# Patient Record
Sex: FEMALE | Race: WHITE | NOT HISPANIC OR LATINO | Employment: FULL TIME | URBAN - METROPOLITAN AREA
[De-identification: names, ages, dates, MRNs, and addresses within clinical notes are randomized per-mention and may not be internally consistent; named-entity substitution may affect disease eponyms.]

---

## 2017-04-25 ENCOUNTER — OFFICE VISIT (OUTPATIENT)
Dept: OBSTETRICS AND GYNECOLOGY | Facility: CLINIC | Age: 42
End: 2017-04-25

## 2017-04-25 VITALS
WEIGHT: 176 LBS | HEIGHT: 69 IN | SYSTOLIC BLOOD PRESSURE: 112 MMHG | DIASTOLIC BLOOD PRESSURE: 80 MMHG | BODY MASS INDEX: 26.07 KG/M2

## 2017-04-25 DIAGNOSIS — Z01.419 ENCOUNTER FOR GYNECOLOGICAL EXAMINATION WITHOUT ABNORMAL FINDING: Primary | ICD-10-CM

## 2017-04-25 DIAGNOSIS — N92.0 MENORRHAGIA WITH REGULAR CYCLE: ICD-10-CM

## 2017-04-25 DIAGNOSIS — Z13.9 SCREENING: ICD-10-CM

## 2017-04-25 DIAGNOSIS — Z12.4 CERVICAL CANCER SCREENING: ICD-10-CM

## 2017-04-25 LAB
B-HCG UR QL: NEGATIVE
BASOPHILS # BLD AUTO: 0.04 10*3/MM3 (ref 0–0.2)
BASOPHILS NFR BLD AUTO: 0.4 % (ref 0–1.5)
BILIRUB BLD-MCNC: NEGATIVE MG/DL
CLARITY, POC: CLEAR
COLOR UR: YELLOW
EOSINOPHIL # BLD AUTO: 0.48 10*3/MM3 (ref 0–0.7)
EOSINOPHIL NFR BLD AUTO: 5 % (ref 0.3–6.2)
ERYTHROCYTE [DISTWIDTH] IN BLOOD BY AUTOMATED COUNT: 13.5 % (ref 11.7–13)
GLUCOSE UR STRIP-MCNC: NEGATIVE MG/DL
HCT VFR BLD AUTO: 45.4 % (ref 35.6–45.5)
HGB BLD-MCNC: 14.4 G/DL (ref 11.9–15.5)
IMM GRANULOCYTES # BLD: 0.05 10*3/MM3 (ref 0–0.03)
IMM GRANULOCYTES NFR BLD: 0.5 % (ref 0–0.5)
INTERNAL NEGATIVE CONTROL: NEGATIVE
INTERNAL POSITIVE CONTROL: POSITIVE
KETONES UR QL: NEGATIVE
LEUKOCYTE EST, POC: NEGATIVE
LYMPHOCYTES # BLD AUTO: 2.61 10*3/MM3 (ref 0.9–4.8)
LYMPHOCYTES NFR BLD AUTO: 27.4 % (ref 19.6–45.3)
Lab: NORMAL
MCH RBC QN AUTO: 31.6 PG (ref 26.9–32)
MCHC RBC AUTO-ENTMCNC: 31.7 G/DL (ref 32.4–36.3)
MCV RBC AUTO: 99.8 FL (ref 80.5–98.2)
MONOCYTES # BLD AUTO: 1.05 10*3/MM3 (ref 0.2–1.2)
MONOCYTES NFR BLD AUTO: 11 % (ref 5–12)
NEUTROPHILS # BLD AUTO: 5.31 10*3/MM3 (ref 1.9–8.1)
NEUTROPHILS NFR BLD AUTO: 55.7 % (ref 42.7–76)
NITRITE UR-MCNC: NEGATIVE MG/ML
PH UR: 5 [PH] (ref 5–8)
PLATELET # BLD AUTO: 407 10*3/MM3 (ref 140–500)
PROT UR STRIP-MCNC: NEGATIVE MG/DL
RBC # BLD AUTO: 4.55 10*6/MM3 (ref 3.9–5.2)
RBC # UR STRIP: NEGATIVE /UL
SP GR UR: 1.02 (ref 1–1.03)
TSH SERPL DL<=0.005 MIU/L-ACNC: 3.6 MIU/ML (ref 0.27–4.2)
UROBILINOGEN UR QL: NORMAL
WBC # BLD AUTO: 9.54 10*3/MM3 (ref 4.5–10.7)

## 2017-04-25 PROCEDURE — 99395 PREV VISIT EST AGE 18-39: CPT | Performed by: OBSTETRICS & GYNECOLOGY

## 2017-04-25 PROCEDURE — 81002 URINALYSIS NONAUTO W/O SCOPE: CPT | Performed by: OBSTETRICS & GYNECOLOGY

## 2017-04-25 PROCEDURE — 81025 URINE PREGNANCY TEST: CPT | Performed by: OBSTETRICS & GYNECOLOGY

## 2017-04-25 NOTE — PROGRESS NOTES
GYN Annual Exam     CC- Here for annual exam.     Nehal Roberts is a 41 y.o. female who presents for annual well woman exam. Periods are regular every 28-30 days, lasting 5 days. Dysmenorrhea:moderate, occurring first 1-2 days of flow. Cyclic symptoms include bloating and breast tenderness. No intermenstrual bleeding, spotting, or discharge.  Patient is sexually active  yes - heterosexual . Patient is satisfied with her contraception yes - Essure. Pt reports she has 3 days of spotting prior to her cycle then very heavy cycle for 2 days- goes through 40 tampons. Tobacco smoker. Has had Mirena in the past and did not like it.     OB History      Para Term  AB TAB SAB Ectopic Multiple Living    3         2          Current contraception: tubal ligation  History of abnormal Pap smear: no  History of abnormal mammogram: never had a MMG  Family history of uterine, colon or ovarian cancer: no  Family history of breast cancer: no    Health Maintenance   Topic Date Due   • PNEUMOCOCCAL VACCINE (19-64 MEDIUM RISK) (1 of 1 - PPSV23) 10/20/1996   • TDAP/TD VACCINES (1 - Tdap) 10/20/1996   • PAP SMEAR  2016   • INFLUENZA VACCINE  2016       Past Medical History:   Diagnosis Date   • Allergic rhinitis    • Anemia    • Anxiety    • Asthma    • Constipation    • Headache        Past Surgical History:   Procedure Laterality Date   • GALLBLADDER SURGERY     • OTHER SURGICAL HISTORY      DENTAL SURGERY   • TONSILLECTOMY           Current Outpatient Prescriptions:   •  albuterol (PROVENTIL HFA;VENTOLIN HFA) 108 (90 BASE) MCG/ACT inhaler, 2 puffs every 4 (four) hours as needed., Disp: , Rfl:   •  lamoTRIgine (LaMICtal) 150 MG tablet, Take by mouth., Disp: , Rfl:   •  VYVANSE 30 MG capsule, TAKE 1 CAPSULE IN THE MORNING, Disp: , Rfl: 0  •  azelastine (ASTELIN) 0.1 % nasal spray, into each nostril 2 (two) times a day. 2 SQUIRTS, Disp: , Rfl:     Allergies   Allergen Reactions   • Amoxicillin    • Doxycycline   "      Social History   Substance Use Topics   • Smoking status: Smoker, Current Status Unknown   • Smokeless tobacco: None   • Alcohol use Yes       Family History   Problem Relation Age of Onset   • Arthritis Mother    • Depression Mother    • Hyperlipidemia Mother    • Hypertension Mother    • Arthritis Father    • Depression Father    • Hyperlipidemia Father    • Mental illness Father    • Mental illness Other        Review of Systems   Constitutional: Negative for activity change and unexpected weight change.   Gastrointestinal: Negative for constipation and diarrhea.   Genitourinary: Positive for menstrual problem. Negative for dyspareunia, vaginal bleeding and vaginal discharge.       /80  Ht 69\" (175.3 cm)  Wt 176 lb (79.8 kg)  LMP 04/09/2017  BMI 25.99 kg/m2    Physical Exam   Constitutional: She is oriented to person, place, and time. She appears well-developed and well-nourished.   HENT:   Mouth/Throat: Normal dentition. No dental caries.   Cardiovascular: Normal rate and regular rhythm.    Pulmonary/Chest: Effort normal and breath sounds normal. Right breast exhibits no inverted nipple, no mass, no nipple discharge, no skin change and no tenderness. Left breast exhibits no inverted nipple, no mass, no nipple discharge, no skin change and no tenderness.   Abdominal: Soft. She exhibits no distension and no mass. There is no tenderness.   Genitourinary: There is no rash, tenderness or lesion on the right labia. There is no rash, tenderness or lesion on the left labia. Uterus is not deviated, not enlarged, not fixed and not tender. Cervix exhibits no motion tenderness, no discharge and no friability. Right adnexum displays no mass, no tenderness and no fullness. Left adnexum displays no mass, no tenderness and no fullness. No tenderness or bleeding in the vagina. No vaginal discharge found.   Genitourinary Comments: Retroverted     Neurological: She is alert and oriented to person, place, and time. "   Psychiatric: She has a normal mood and affect. Her behavior is normal. Judgment and thought content normal.   Vitals reviewed.         Assessment/Plan    1) GYN HM: Check pap smear. Needs MMG this week.     2) Menorrhagia: Check TSH, CBC. FU for TVUS and endometrial biopsy. Pt declines Mirena IUD. Tobacco smoker. Desires endometrial ablation.  3) Contraception: Essure  4) Bipolar  5) Diet and Exercise discussed: works out 4x per week  6) Smoking Status: .5ppd  7) Social:   8) Follow up prn and 1 year       Diagnoses and all orders for this visit:    Encounter for gynecological examination without abnormal finding    Screening  -     POC Urinalysis Dipstick  -     POC Pregnancy, Urine          Sarah Beth Ray,   4/25/2017  9:03 AM

## 2017-04-28 LAB
CYTOLOGIST CVX/VAG CYTO: NORMAL
CYTOLOGY CVX/VAG DOC THIN PREP: NORMAL
DX ICD CODE: NORMAL
HIV 1 & 2 AB SER-IMP: NORMAL
HPV I/H RISK 1 DNA CVX QL PROBE+SIG AMP: NEGATIVE
OTHER STN SPEC: NORMAL
PATH REPORT.FINAL DX SPEC: NORMAL
STAT OF ADQ CVX/VAG CYTO-IMP: NORMAL

## 2017-06-06 ENCOUNTER — PROCEDURE VISIT (OUTPATIENT)
Dept: OBSTETRICS AND GYNECOLOGY | Facility: CLINIC | Age: 42
End: 2017-06-06

## 2017-06-06 ENCOUNTER — OFFICE VISIT (OUTPATIENT)
Dept: OBSTETRICS AND GYNECOLOGY | Facility: CLINIC | Age: 42
End: 2017-06-06

## 2017-06-06 ENCOUNTER — PREP FOR SURGERY (OUTPATIENT)
Dept: OTHER | Facility: HOSPITAL | Age: 42
End: 2017-06-06

## 2017-06-06 VITALS
BODY MASS INDEX: 26.51 KG/M2 | DIASTOLIC BLOOD PRESSURE: 80 MMHG | HEIGHT: 69 IN | SYSTOLIC BLOOD PRESSURE: 118 MMHG | WEIGHT: 179 LBS

## 2017-06-06 DIAGNOSIS — Z13.9 SCREENING: ICD-10-CM

## 2017-06-06 DIAGNOSIS — N92.0 MENORRHAGIA WITH REGULAR CYCLE: Primary | ICD-10-CM

## 2017-06-06 LAB

## 2017-06-06 PROCEDURE — 99213 OFFICE O/P EST LOW 20 MIN: CPT | Performed by: OBSTETRICS & GYNECOLOGY

## 2017-06-06 PROCEDURE — 76830 TRANSVAGINAL US NON-OB: CPT | Performed by: OBSTETRICS & GYNECOLOGY

## 2017-06-06 PROCEDURE — 81025 URINE PREGNANCY TEST: CPT | Performed by: OBSTETRICS & GYNECOLOGY

## 2017-06-06 PROCEDURE — 81002 URINALYSIS NONAUTO W/O SCOPE: CPT | Performed by: OBSTETRICS & GYNECOLOGY

## 2017-06-06 PROCEDURE — 58100 BIOPSY OF UTERUS LINING: CPT | Performed by: OBSTETRICS & GYNECOLOGY

## 2017-06-06 RX ORDER — SODIUM CHLORIDE 0.9 % (FLUSH) 0.9 %
1-10 SYRINGE (ML) INJECTION AS NEEDED
Status: CANCELLED | OUTPATIENT
Start: 2017-06-06

## 2017-06-06 NOTE — PROGRESS NOTES
"FU VISIT    Chief Complaint: Menorrhagia      Nehal Roberts is a 41 y.o. patient who presents for follow up of menorrhagia. Pt desires an endometrial ablation for treatment.   Chief Complaint   Patient presents with   • Biopsy     endo, had u/s today             The following portions of the patient's history were reviewed and updated as appropriate: allergies, current medications and problem list.    Review of Systems   Genitourinary: Positive for menstrual problem and pelvic pain. Negative for dyspareunia.       /80  Ht 69\" (175.3 cm)  Wt 179 lb (81.2 kg)  LMP 05/31/2017 (Exact Date)  Breastfeeding? No  BMI 26.43 kg/m2    Physical Exam   Constitutional: She appears well-developed and well-nourished. No distress.   Genitourinary: There is no rash, tenderness or lesion on the right labia. There is no rash, tenderness or lesion on the left labia. Cervix exhibits no motion tenderness, no discharge and no friability. No erythema or tenderness in the vagina. No vaginal discharge found.   Vitals reviewed.        Assessment/Plan   Nehal was seen today for biopsy.    Diagnoses and all orders for this visit:    Menorrhagia with regular cycle    Screening  -     POC Urinalysis Dipstick  -     POC Pregnancy, Urine    40yo with menorrhagia    1) gyn HM: LPS 4/2017 normal with neg HR HPV    2) Contraception: essure    3) Menorrhagia: Normal TSH and CBC. US done today with EM lining 1.3cm. Normal ovaries. No fibroids. Essure coils visualized. No comparative US. Endometrial biopsy performed. Pt given options for treatment. She is a tobacco smoker and not a candidate for OCPs. Pt declines a Mirena IUD. Desires an endometrial ablation. Procedure reviewed. Will schedule.    4) Tobacco smoker                 No Follow-up on file.      Sarah Beth Ray,     6/6/2017  9:21 AM  "

## 2017-06-06 NOTE — PROGRESS NOTES
PROCEDURE NOTE    Chief Complaint: menorrhagia    Endometrial Biopsy  Date/Time: 6/6/2017 4:48 PM  Performed by: NARGIS GEIGER  Authorized by: NARGIS GEIGER   Preparation: Patient was prepped and draped in the usual sterile fashion.  Local anesthesia used: no    Anesthesia:  Local anesthesia used: no  Sedation:  Patient sedated: no    Patient tolerance: Patient tolerated the procedure well with no immediate complications            Diagnosis  Abnormal Uterine bleeding        Patient's last menstrual period was 05/31/2017 (exact date).         UCG  negative    Menopausal No     Applying Universal Precautions I properly identified the patient and sought her signature with informed consent.  The reason for the biopsy was discussed.  Using a betadine prep on the cervix the cervix was grasped with a tenaculum and the uterus sounded to 7 cm.  A disposable Pipelle was used to retrieve the specimen by passing it 5 times into the cavity hoping to sample more than one area.    Additional procedures necessary were not necessary  The specimen was labelled and placed in a formalin container.    Instructions were given on vaginal rest, OTC tylenol, Motrin or Aleve, and expected future bleeding.  Resulting and follow up were discussed.

## 2017-06-08 LAB
DX ICD CODE: NORMAL
DX ICD CODE: NORMAL
PATH REPORT.FINAL DX SPEC: NORMAL
PATH REPORT.GROSS SPEC: NORMAL
PATH REPORT.SITE OF ORIGIN SPEC: NORMAL
PATHOLOGIST NAME: NORMAL
PAYMENT PROCEDURE: NORMAL

## 2017-06-09 RX ORDER — DEXTROAMPHETAMINE SULFATE 10 MG/1
10 CAPSULE, EXTENDED RELEASE ORAL DAILY
COMMUNITY
End: 2017-11-17

## 2017-06-13 ENCOUNTER — ANESTHESIA EVENT (OUTPATIENT)
Dept: PERIOP | Facility: HOSPITAL | Age: 42
End: 2017-06-13

## 2017-06-14 ENCOUNTER — ANESTHESIA (OUTPATIENT)
Dept: PERIOP | Facility: HOSPITAL | Age: 42
End: 2017-06-14

## 2017-06-14 ENCOUNTER — HOSPITAL ENCOUNTER (OUTPATIENT)
Facility: HOSPITAL | Age: 42
Setting detail: HOSPITAL OUTPATIENT SURGERY
Discharge: HOME | End: 2017-06-14
Attending: OBSTETRICS & GYNECOLOGY | Admitting: OBSTETRICS & GYNECOLOGY

## 2017-06-14 VITALS
TEMPERATURE: 98 F | OXYGEN SATURATION: 94 % | DIASTOLIC BLOOD PRESSURE: 70 MMHG | BODY MASS INDEX: 26.22 KG/M2 | SYSTOLIC BLOOD PRESSURE: 113 MMHG | HEART RATE: 73 BPM | HEIGHT: 69 IN | RESPIRATION RATE: 15 BRPM | WEIGHT: 177 LBS

## 2017-06-14 DIAGNOSIS — N92.0 MENORRHAGIA WITH REGULAR CYCLE: ICD-10-CM

## 2017-06-14 LAB — HCG SERPL QL: NEGATIVE

## 2017-06-14 PROCEDURE — 25010000002 KETOROLAC TROMETHAMINE PER 15 MG: Performed by: NURSE ANESTHETIST, CERTIFIED REGISTERED

## 2017-06-14 PROCEDURE — 25010000002 DEXAMETHASONE PER 1 MG: Performed by: NURSE ANESTHETIST, CERTIFIED REGISTERED

## 2017-06-14 PROCEDURE — 25010000002 ONDANSETRON PER 1 MG: Performed by: NURSE ANESTHETIST, CERTIFIED REGISTERED

## 2017-06-14 PROCEDURE — 25010000002 PROPOFOL 10 MG/ML EMULSION: Performed by: NURSE ANESTHETIST, CERTIFIED REGISTERED

## 2017-06-14 PROCEDURE — 25010000002 DIPHENHYDRAMINE PER 50 MG: Performed by: NURSE ANESTHETIST, CERTIFIED REGISTERED

## 2017-06-14 PROCEDURE — 58563 HYSTEROSCOPY ABLATION: CPT | Performed by: OBSTETRICS & GYNECOLOGY

## 2017-06-14 PROCEDURE — 25010000002 MIDAZOLAM PER 1 MG: Performed by: NURSE ANESTHETIST, CERTIFIED REGISTERED

## 2017-06-14 PROCEDURE — S0260 H&P FOR SURGERY: HCPCS | Performed by: OBSTETRICS & GYNECOLOGY

## 2017-06-14 PROCEDURE — 84703 CHORIONIC GONADOTROPIN ASSAY: CPT | Performed by: OBSTETRICS & GYNECOLOGY

## 2017-06-14 RX ORDER — OXYCODONE HYDROCHLORIDE AND ACETAMINOPHEN 5; 325 MG/1; MG/1
1 TABLET ORAL ONCE AS NEEDED
Status: DISCONTINUED | OUTPATIENT
Start: 2017-06-14 | End: 2017-06-14 | Stop reason: HOSPADM

## 2017-06-14 RX ORDER — IPRATROPIUM BROMIDE AND ALBUTEROL SULFATE 2.5; .5 MG/3ML; MG/3ML
3 SOLUTION RESPIRATORY (INHALATION) ONCE
Status: COMPLETED | OUTPATIENT
Start: 2017-06-14 | End: 2017-06-14

## 2017-06-14 RX ORDER — FAMOTIDINE 10 MG/ML
20 INJECTION, SOLUTION INTRAVENOUS
Status: DISCONTINUED | OUTPATIENT
Start: 2017-06-14 | End: 2017-06-14 | Stop reason: HOSPADM

## 2017-06-14 RX ORDER — SODIUM CHLORIDE, SODIUM LACTATE, POTASSIUM CHLORIDE, CALCIUM CHLORIDE 600; 310; 30; 20 MG/100ML; MG/100ML; MG/100ML; MG/100ML
9 INJECTION, SOLUTION INTRAVENOUS CONTINUOUS PRN
Status: DISCONTINUED | OUTPATIENT
Start: 2017-06-14 | End: 2017-06-14 | Stop reason: HOSPADM

## 2017-06-14 RX ORDER — SODIUM CHLORIDE 0.9 % (FLUSH) 0.9 %
1-10 SYRINGE (ML) INJECTION AS NEEDED
Status: DISCONTINUED | OUTPATIENT
Start: 2017-06-14 | End: 2017-06-14 | Stop reason: HOSPADM

## 2017-06-14 RX ORDER — LIDOCAINE HYDROCHLORIDE 10 MG/ML
0.5 INJECTION, SOLUTION EPIDURAL; INFILTRATION; INTRACAUDAL; PERINEURAL ONCE AS NEEDED
Status: COMPLETED | OUTPATIENT
Start: 2017-06-14 | End: 2017-06-14

## 2017-06-14 RX ORDER — IBUPROFEN 800 MG/1
800 TABLET ORAL EVERY 8 HOURS PRN
Qty: 30 TABLET | Refills: 0 | Status: SHIPPED | OUTPATIENT
Start: 2017-06-14 | End: 2017-07-14

## 2017-06-14 RX ORDER — MIDAZOLAM HYDROCHLORIDE 1 MG/ML
1 INJECTION INTRAMUSCULAR; INTRAVENOUS
Status: DISCONTINUED | OUTPATIENT
Start: 2017-06-14 | End: 2017-06-14 | Stop reason: HOSPADM

## 2017-06-14 RX ORDER — LIDOCAINE HYDROCHLORIDE 20 MG/ML
INJECTION, SOLUTION INFILTRATION; PERINEURAL AS NEEDED
Status: DISCONTINUED | OUTPATIENT
Start: 2017-06-14 | End: 2017-06-14 | Stop reason: SURG

## 2017-06-14 RX ORDER — MIDAZOLAM HYDROCHLORIDE 1 MG/ML
2 INJECTION INTRAMUSCULAR; INTRAVENOUS
Status: DISCONTINUED | OUTPATIENT
Start: 2017-06-14 | End: 2017-06-14 | Stop reason: HOSPADM

## 2017-06-14 RX ORDER — DIPHENHYDRAMINE HYDROCHLORIDE 50 MG/ML
12.5 INJECTION INTRAMUSCULAR; INTRAVENOUS ONCE
Status: COMPLETED | OUTPATIENT
Start: 2017-06-14 | End: 2017-06-14

## 2017-06-14 RX ORDER — SODIUM CHLORIDE 9 MG/ML
INJECTION, SOLUTION INTRAVENOUS AS NEEDED
Status: DISCONTINUED | OUTPATIENT
Start: 2017-06-14 | End: 2017-06-14 | Stop reason: HOSPADM

## 2017-06-14 RX ORDER — DEXAMETHASONE SODIUM PHOSPHATE 4 MG/ML
INJECTION, SOLUTION INTRA-ARTICULAR; INTRALESIONAL; INTRAMUSCULAR; INTRAVENOUS; SOFT TISSUE AS NEEDED
Status: DISCONTINUED | OUTPATIENT
Start: 2017-06-14 | End: 2017-06-14 | Stop reason: SURG

## 2017-06-14 RX ORDER — ONDANSETRON 2 MG/ML
4 INJECTION INTRAMUSCULAR; INTRAVENOUS ONCE AS NEEDED
Status: DISCONTINUED | OUTPATIENT
Start: 2017-06-14 | End: 2017-06-14 | Stop reason: HOSPADM

## 2017-06-14 RX ORDER — KETOROLAC TROMETHAMINE 30 MG/ML
INJECTION, SOLUTION INTRAMUSCULAR; INTRAVENOUS AS NEEDED
Status: DISCONTINUED | OUTPATIENT
Start: 2017-06-14 | End: 2017-06-14 | Stop reason: SURG

## 2017-06-14 RX ORDER — MORPHINE SULFATE 10 MG/ML
2 INJECTION INTRAMUSCULAR; INTRAVENOUS; SUBCUTANEOUS
Status: DISCONTINUED | OUTPATIENT
Start: 2017-06-14 | End: 2017-06-14 | Stop reason: HOSPADM

## 2017-06-14 RX ORDER — PROPOFOL 10 MG/ML
VIAL (ML) INTRAVENOUS AS NEEDED
Status: DISCONTINUED | OUTPATIENT
Start: 2017-06-14 | End: 2017-06-14 | Stop reason: SURG

## 2017-06-14 RX ORDER — OXYCODONE HYDROCHLORIDE AND ACETAMINOPHEN 5; 325 MG/1; MG/1
1 TABLET ORAL EVERY 4 HOURS PRN
Qty: 15 TABLET | Refills: 0 | Status: SHIPPED | OUTPATIENT
Start: 2017-06-14 | End: 2017-11-17

## 2017-06-14 RX ORDER — MEPERIDINE HYDROCHLORIDE 25 MG/ML
12.5 INJECTION INTRAMUSCULAR; INTRAVENOUS; SUBCUTANEOUS
Status: DISCONTINUED | OUTPATIENT
Start: 2017-06-14 | End: 2017-06-14 | Stop reason: HOSPADM

## 2017-06-14 RX ORDER — ONDANSETRON 2 MG/ML
4 INJECTION INTRAMUSCULAR; INTRAVENOUS ONCE AS NEEDED
Status: COMPLETED | OUTPATIENT
Start: 2017-06-14 | End: 2017-06-14

## 2017-06-14 RX ORDER — IBUPROFEN 600 MG/1
600 TABLET ORAL ONCE AS NEEDED
Status: DISCONTINUED | OUTPATIENT
Start: 2017-06-14 | End: 2017-06-14 | Stop reason: HOSPADM

## 2017-06-14 RX ADMIN — FAMOTIDINE 20 MG: 10 INJECTION, SOLUTION INTRAVENOUS at 08:33

## 2017-06-14 RX ADMIN — PROPOFOL 150 MG: 10 INJECTION, EMULSION INTRAVENOUS at 09:20

## 2017-06-14 RX ADMIN — SODIUM CHLORIDE, POTASSIUM CHLORIDE, SODIUM LACTATE AND CALCIUM CHLORIDE 9 ML/HR: 600; 310; 30; 20 INJECTION, SOLUTION INTRAVENOUS at 08:14

## 2017-06-14 RX ADMIN — DIPHENHYDRAMINE HYDROCHLORIDE 12.5 MG: 50 INJECTION, SOLUTION INTRAMUSCULAR; INTRAVENOUS at 08:34

## 2017-06-14 RX ADMIN — SODIUM CHLORIDE, POTASSIUM CHLORIDE, SODIUM LACTATE AND CALCIUM CHLORIDE: 600; 310; 30; 20 INJECTION, SOLUTION INTRAVENOUS at 09:05

## 2017-06-14 RX ADMIN — KETOROLAC TROMETHAMINE 30 MG: 30 INJECTION INTRAMUSCULAR; INTRAVENOUS at 09:24

## 2017-06-14 RX ADMIN — MIDAZOLAM HYDROCHLORIDE 2 MG: 1 INJECTION, SOLUTION INTRAMUSCULAR; INTRAVENOUS at 09:15

## 2017-06-14 RX ADMIN — LIDOCAINE HYDROCHLORIDE 100 MG: 20 INJECTION, SOLUTION INFILTRATION; PERINEURAL at 09:20

## 2017-06-14 RX ADMIN — IPRATROPIUM BROMIDE AND ALBUTEROL SULFATE 3 ML: .5; 3 SOLUTION RESPIRATORY (INHALATION) at 08:21

## 2017-06-14 RX ADMIN — LIDOCAINE HYDROCHLORIDE 0.5 ML: 10 INJECTION, SOLUTION EPIDURAL; INFILTRATION; INTRACAUDAL; PERINEURAL at 08:14

## 2017-06-14 RX ADMIN — DEXAMETHASONE SODIUM PHOSPHATE 4 MG: 4 INJECTION, SOLUTION INTRAMUSCULAR; INTRAVENOUS at 09:24

## 2017-06-14 RX ADMIN — ONDANSETRON 4 MG: 2 INJECTION, SOLUTION INTRAMUSCULAR; INTRAVENOUS at 08:33

## 2017-06-14 NOTE — PLAN OF CARE
Problem: Perioperative Period (Adult)  Goal: Signs and Symptoms of Listed Potential Problems Will be Absent or Manageable (Perioperative Period)  Outcome: Ongoing (interventions implemented as appropriate)    06/14/17 1022   Perioperative Period   Problems Assessed (Perioperative Period) all   Problems Present (Perioperative Period) pain;physiologic stress response            no

## 2017-06-14 NOTE — H&P
Patient Care Team:  John Paul Clark MD as PCP - General (Family Medicine)    Chief complaint menorrhagia       HPI:  40yo presented to the office complaining of menorrhagia with monthly cycle. Normal TSH and CBC. US done with EM lining 1.3cm. Normal ovaries. No fibroids. Essure coils visualized. Endometrial biopsy performed and benign pathology. Pt given options for treatment. She is a tobacco smoker and not a candidate for OCPs. Pt declind a Mirena IUD. Desires an endometrial ablation. Procedure reviewed.    PMH:   Past Medical History:   Diagnosis Date   • ADHD (attention deficit hyperactivity disorder)    • Allergic rhinitis    • Anemia    • Anxiety    • Asthma    • Cervical dysplasia     stage 0-1   • Constipation    • Headache          PSH:   Past Surgical History:   Procedure Laterality Date   • BREAST CYST EXCISION Right    • D&C WITH SUCTION     • ESSURE TUBAL LIGATION     • GALLBLADDER SURGERY  1996   • OTHER SURGICAL HISTORY      DENTAL SURGERY   • TONSILLECTOMY         SoHx:   Social History     Social History   • Marital status:      Spouse name: N/A   • Number of children: N/A   • Years of education: N/A     Occupational History   • Not on file.     Social History Main Topics   • Smoking status: Current Every Day Smoker     Packs/day: 0.50     Years: 25.00     Types: Cigarettes   • Smokeless tobacco: Never Used   • Alcohol use Yes      Comment: social   • Drug use: No   • Sexual activity: Defer     Other Topics Concern   • Not on file     Social History Narrative       FHx:   Family History   Problem Relation Age of Onset   • Arthritis Mother    • Depression Mother    • Hyperlipidemia Mother    • Hypertension Mother    • Sleep apnea Mother    • Anesthesia problems Mother    • Arthritis Father    • Depression Father    • Hyperlipidemia Father    • Mental illness Father    • Mental illness Other        PGyn Hx: UTD      Allergies: Doxycycline and Amoxicillin    Medications:   No current  facility-administered medications on file prior to encounter.      Current Outpatient Prescriptions on File Prior to Encounter   Medication Sig Dispense Refill   • lamoTRIgine (LaMICtal) 150 MG tablet Take 200 mg by mouth Every Night.     • VYVANSE 30 MG capsule Take 30 mg by mouth Every Morning. TAKE 1 CAPSULE IN THE MORNING  0   • albuterol (PROVENTIL HFA;VENTOLIN HFA) 108 (90 BASE) MCG/ACT inhaler Inhale 2 puffs Every 4 (Four) Hours As Needed for Wheezing or Shortness of Air.               Vital Signs  Temp:  [97.4 °F (36.3 °C)] 97.4 °F (36.3 °C)  Heart Rate:  [82-87] 87  Resp:  [16-18] 18  BP: (113)/(75) 113/75    Physical Exam:  General: NAD  Heart:: RRR  Lungs: clear  Abdomen: soft, NT  Genitalia: deferred to OR  Extremities: no calf tenderness    Labs: CBC and TSH normal.      Assessment/Plan: 40yo with menorrhagia. Proceed with hysteroscopy and endometrial ablation        I discussed the patients findings and my recommendations with patient and family.     Sarah Beth Ray DO  06/14/17  9:15 AM

## 2017-06-14 NOTE — ANESTHESIA POSTPROCEDURE EVALUATION
Patient: Nehal Roberts    Procedure Summary     Date Anesthesia Start Anesthesia Stop Room / Location    06/14/17 0916 0952 [facility] / [facility]       Procedure Diagnosis Surgeon Provider    HYSTEROSCOPY WITH ENDOMETRIAL ABLATION (N/A Vagina) Menorrhagia with regular cycle  (Menorrhagia with regular cycle [N92.0]) DO Louisa Dumont CRNA          Anesthesia Type: general  Last vitals  /65 (06/14/17 1004)    Temp 98 °F (36.7 °C) (06/14/17 0948)    Pulse 70 (06/14/17 1004)   Resp 12 (06/14/17 0948)    SpO2 92 % (06/14/17 1004)      Post Anesthesia Care and Evaluation    Patient location during evaluation: bedside  Patient participation: complete - patient participated  Level of consciousness: awake and alert  Pain score: 0  Pain management: adequate  Airway patency: patent  Anesthetic complications: No anesthetic complications  PONV Status: none  Cardiovascular status: acceptable  Respiratory status: acceptable  Hydration status: acceptable

## 2017-06-14 NOTE — ANESTHESIA PROCEDURE NOTES
Airway  Urgency: elective    Airway not difficult    General Information and Staff    Patient location during procedure: OR  CRNA: NATHANIEL MICHEL    Indications and Patient Condition  Indications for airway management: airway protection    Preoxygenated: yes  MILS maintained throughout  Mask difficulty assessment: 1 - vent by mask    Final Airway Details  Final airway type: supraglottic airway      Successful airway: unique  Size 4    Number of attempts at approach: 1

## 2017-06-14 NOTE — PLAN OF CARE
Problem: Patient Care Overview (Adult)  Goal: Adult Individualization and Mutuality  Outcome: Ongoing (interventions implemented as appropriate)    06/14/17 1014   Individualization   Patient Specific Preferences goes by peri   Patient Specific Goals go home today   Patient Specific Interventions pain control as needed   Mutuality/Individual Preferences   What Anxieties, Fears or Concerns Do You Have About Your Health or Care? none voiced   What Questions Do You Have About Your Health or Care? is it over?   What Information Would Help Us Give You More Personalized Care? i dont know

## 2017-06-14 NOTE — PLAN OF CARE
Problem: Patient Care Overview (Adult)  Goal: Adult Individualization and Mutuality  Outcome: Outcome(s) achieved Date Met:  06/14/17 06/14/17 1014   Individualization   Patient Specific Preferences goes by peri

## 2017-06-14 NOTE — PLAN OF CARE
Problem: Patient Care Overview (Adult)  Goal: Plan of Care Review  Outcome: Outcome(s) achieved Date Met:  06/14/17 06/14/17 1014 06/14/17 1024   Coping/Psychosocial Response Interventions   Plan Of Care Reviewed With patient --    Patient Care Overview   Progress improving --    Outcome Evaluation   Outcome Summary/Follow up Plan --  vss, waiting to go home

## 2017-06-14 NOTE — PLAN OF CARE
Problem: Patient Care Overview (Adult)  Goal: Plan of Care Review  Outcome: Ongoing (interventions implemented as appropriate)    06/14/17 0742   Coping/Psychosocial Response Interventions   Plan Of Care Reviewed With patient   Patient Care Overview   Progress no change   Outcome Evaluation   Outcome Summary/Follow up Plan vss, waiting for procedure

## 2017-06-14 NOTE — PLAN OF CARE
Problem: Patient Care Overview (Adult)  Goal: Adult Individualization and Mutuality  Outcome: Ongoing (interventions implemented as appropriate)    06/14/17 0789   Individualization   Patient Specific Preferences none

## 2017-06-14 NOTE — PLAN OF CARE
Problem: Patient Care Overview (Adult)  Goal: Plan of Care Review  Outcome: Ongoing (interventions implemented as appropriate)    06/14/17 1014   Coping/Psychosocial Response Interventions   Plan Of Care Reviewed With patient   Patient Care Overview   Progress improving   Outcome Evaluation   Outcome Summary/Follow up Plan denies pain or nausea

## 2017-06-14 NOTE — ANESTHESIA PREPROCEDURE EVALUATION
" Anesthesia Evaluation     Patient summary reviewed and Nursing notes reviewed   no history of anesthetic complications:  NPO Solid Status: > 8 hours  NPO Liquid Status: > 8 hours     Airway   Mallampati: I  TM distance: >3 FB  Neck ROM: full  no difficulty expected  Dental - normal exam     Pulmonary - normal exam    breath sounds clear to auscultation  (+) a smoker (1/2 PPD for 25 years) Current, asthma (Hasn't used inhaler \"in a while\"),   Cardiovascular - negative cardio ROS and normal exam        Neuro/Psych  (+) psychiatric history Bipolar and Anxiety,    GI/Hepatic/Renal/Endo - negative ROS     Musculoskeletal (-) negative ROS    Abdominal  - normal exam   Substance History   Alcohol use: occ.     OB/GYN negative ob/gyn ROS   Gestational age approximate: LMP june 1.        Other - negative ROS                                       Anesthesia Plan    ASA 2     general     intravenous induction   Anesthetic plan and risks discussed with patient.  Use of blood products discussed with patient  Consented to blood products.     "

## 2017-06-14 NOTE — OP NOTE
Subjective     Date of Service:  06/14/17  Time of Service:  9:52 AM    Surgical Staff: Surgeon(s) and Role:     * Sarah Beth Ray, DO - Primary   Additional Staff: None   Pre-operative diagnosis(es): Pre-Op Diagnosis Codes:     * Menorrhagia with regular cycle [N92.0]     Post-operative diagnosis(es): Post-Op Diagnosis Codes:     * Menorrhagia with regular cycle [N92.0]   Procedure(s): Procedure(s):  HYSTEROSCOPY WITH ENDOMETRIAL ABLATION     Antibiotics: none ordered on call to OR     Anesthesia: Type: General  ASA:  II     Objective      Operative findings: Abundant endometrial tissue. No fibroids or polyps   Specimens removed: * No specimens in log *   Fluid Intake: 550mL   Output: Documented Output  Est. 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  Patient had a workup consisting of a normal TSH, CBC, transvaginal ultrasound and endometrial biopsy.  Patient was given options and decided to proceed with a endometrial ablation.  Patient is a tobacco smoker and not a candidate for combined birth control pills.  Patient declined Mirena IUD.  After-hours benefits and alternatives reviewed, the patient's taking the operating room placed under general anesthesia.  Patient's placement dorsal lithotomy position and prepped and draped in normal sterile fashion.  Bimanual exam prior to starting procedure revealed a retroverted uterus.  A speculum was placed in the vagina CO2 tenaculum was used to grasp the anterior lip of the cervix.  The initial cavity was measured at 6 cm.  The  hysteroscope was then inserted and the initial cavity was distended with normal saline.  A normal endometrial cavity was appreciated.  Patient's essure coils were not visualized.  The hysteroscope was then removed and the NovaSure system was placed into the endometrial cavity.  The endometrial cavity width was measured at 4 cm.  The system was then tested and passed and a full therapy cycle of 1 minute and 40 seconds was performed without difficulty.  At this point the procedure was deemed over and all instrument removed from the patient's vagina.  Patient tolerated procedure well and is resting and postanesthesia recovery.  She was given discharge instructions and she'll follow up in approximately 2 weeks.

## 2017-06-14 NOTE — PLAN OF CARE
Problem: Perioperative Period (Adult)  Goal: Signs and Symptoms of Listed Potential Problems Will be Absent or Manageable (Perioperative Period)  Outcome: Ongoing (interventions implemented as appropriate)    06/14/17 8274   Perioperative Period   Problems Assessed (Perioperative Period) all   Problems Present (Perioperative Period) none

## 2017-06-14 NOTE — PLAN OF CARE
Problem: Perioperative Period (Adult)  Goal: Signs and Symptoms of Listed Potential Problems Will be Absent or Manageable (Perioperative Period)  Outcome: Outcome(s) achieved Date Met:  06/14/17 06/14/17 1022 06/14/17 1025   Perioperative Period   Problems Assessed (Perioperative Period) all --    Problems Present (Perioperative Period) --  none

## 2017-06-27 ENCOUNTER — OFFICE VISIT (OUTPATIENT)
Dept: OBSTETRICS AND GYNECOLOGY | Facility: CLINIC | Age: 42
End: 2017-06-27

## 2017-06-27 VITALS
HEIGHT: 69 IN | SYSTOLIC BLOOD PRESSURE: 116 MMHG | DIASTOLIC BLOOD PRESSURE: 72 MMHG | WEIGHT: 175 LBS | BODY MASS INDEX: 25.92 KG/M2

## 2017-06-27 DIAGNOSIS — N92.0 MENORRHAGIA WITH REGULAR CYCLE: ICD-10-CM

## 2017-06-27 DIAGNOSIS — Z09 POSTOP CHECK: Primary | ICD-10-CM

## 2017-06-27 DIAGNOSIS — Z13.9 SCREENING: ICD-10-CM

## 2017-06-27 LAB
BILIRUB BLD-MCNC: NEGATIVE MG/DL
CLARITY, POC: CLEAR
COLOR UR: YELLOW
GLUCOSE UR STRIP-MCNC: NEGATIVE MG/DL
KETONES UR QL: NEGATIVE
LEUKOCYTE EST, POC: NEGATIVE
NITRITE UR-MCNC: NEGATIVE MG/ML
PH UR: 6 [PH] (ref 5–8)
PROT UR STRIP-MCNC: NEGATIVE MG/DL
RBC # UR STRIP: ABNORMAL /UL
SP GR UR: 1 (ref 1–1.03)
UROBILINOGEN UR QL: NORMAL

## 2017-06-27 PROCEDURE — 81002 URINALYSIS NONAUTO W/O SCOPE: CPT | Performed by: OBSTETRICS & GYNECOLOGY

## 2017-06-27 PROCEDURE — 99212 OFFICE O/P EST SF 10 MIN: CPT | Performed by: OBSTETRICS & GYNECOLOGY

## 2017-06-27 RX ORDER — LAMOTRIGINE 200 MG/1
TABLET ORAL
COMMUNITY
Start: 2017-06-26 | End: 2021-02-23 | Stop reason: SDUPTHER

## 2017-06-27 NOTE — PROGRESS NOTES
"Surgical follow up visit     Chief Complaint: Post op    Nehal Roberts is a 41 y.o. female who presents to the clinic 2 weeks status post hysteroscopy with Novasure endometrial ablation for menorrhagia. Eating a regular diet without difficulty. Bowel movements are normal. The patient is not having any pain. Having vaginal bleeding that started 4 days ago. Started to be heavier today but only using panty liner. Having some cramping. Having watery discharge but no foul odors.    The following portions of the patient's history were reviewed and updated as appropriate: allergies, past family history and problem list.    Review of Systems  Pertinent items are noted in HPI.     Objective    /72  Ht 69\" (175.3 cm)  Wt 175 lb (79.4 kg)  LMP 06/24/2017 (Exact Date) Comment: post op  Breastfeeding? No  BMI 25.84 kg/m2    General:  alert, appears stated age and cooperative   Abdomen: soft, bowel sounds active, non-tender                               Assessment     1) Post op. Doing well postoperatively. Pt reassured that her bleeding is normal; and it may take 2 months to determine effectiveness of procedure.  2) Operative findings again reviewed.      Plan    1. Continue any current medications.  2. Wound care discussed.  3. Activity restrictions: none to continue  4. Anticipated return to work: pt already back at work.  5. Follow up: 1 year for annual exam. Pt to call if not happy with her results.     Sarah Beth Ray, DO    6/27/2017  1:13 PM      "

## 2017-11-07 DIAGNOSIS — Z00.00 ANNUAL PHYSICAL EXAM: Primary | ICD-10-CM

## 2017-11-12 ENCOUNTER — RESULTS ENCOUNTER (OUTPATIENT)
Dept: FAMILY MEDICINE CLINIC | Facility: CLINIC | Age: 42
End: 2017-11-12

## 2017-11-12 DIAGNOSIS — Z00.00 ANNUAL PHYSICAL EXAM: ICD-10-CM

## 2017-11-14 LAB
ALBUMIN SERPL-MCNC: 4.2 G/DL (ref 3.5–5.2)
ALBUMIN/GLOB SERPL: 1.7 G/DL
ALP SERPL-CCNC: 56 U/L (ref 39–117)
ALT SERPL-CCNC: 14 U/L (ref 1–33)
AST SERPL-CCNC: 12 U/L (ref 1–32)
BASOPHILS # BLD AUTO: 0.03 10*3/MM3 (ref 0–0.2)
BASOPHILS NFR BLD AUTO: 0.2 % (ref 0–1.5)
BILIRUB SERPL-MCNC: 0.6 MG/DL (ref 0.1–1.2)
BUN SERPL-MCNC: 11 MG/DL (ref 6–20)
BUN/CREAT SERPL: 12.9 (ref 7–25)
CALCIUM SERPL-MCNC: 9.1 MG/DL (ref 8.6–10.5)
CHLORIDE SERPL-SCNC: 104 MMOL/L (ref 98–107)
CHOLEST SERPL-MCNC: 231 MG/DL (ref 0–200)
CO2 SERPL-SCNC: 23.1 MMOL/L (ref 22–29)
CREAT SERPL-MCNC: 0.85 MG/DL (ref 0.57–1)
EOSINOPHIL # BLD AUTO: 0.9 10*3/MM3 (ref 0–0.7)
EOSINOPHIL NFR BLD AUTO: 6.9 % (ref 0.3–6.2)
ERYTHROCYTE [DISTWIDTH] IN BLOOD BY AUTOMATED COUNT: 13.2 % (ref 11.7–13)
GFR SERPLBLD CREATININE-BSD FMLA CKD-EPI: 74 ML/MIN/1.73
GFR SERPLBLD CREATININE-BSD FMLA CKD-EPI: 90 ML/MIN/1.73
GLOBULIN SER CALC-MCNC: 2.5 GM/DL
GLUCOSE SERPL-MCNC: 90 MG/DL (ref 65–99)
HCT VFR BLD AUTO: 46.7 % (ref 35.6–45.5)
HDLC SERPL-MCNC: 38 MG/DL (ref 40–60)
HGB BLD-MCNC: 15 G/DL (ref 11.9–15.5)
IMM GRANULOCYTES # BLD: 0.04 10*3/MM3 (ref 0–0.03)
IMM GRANULOCYTES NFR BLD: 0.3 % (ref 0–0.5)
LDLC SERPL CALC-MCNC: 171 MG/DL (ref 0–100)
LDLC/HDLC SERPL: 4.5 {RATIO}
LYMPHOCYTES # BLD AUTO: 2.62 10*3/MM3 (ref 0.9–4.8)
LYMPHOCYTES NFR BLD AUTO: 20.1 % (ref 19.6–45.3)
MCH RBC QN AUTO: 32.3 PG (ref 26.9–32)
MCHC RBC AUTO-ENTMCNC: 32.1 G/DL (ref 32.4–36.3)
MCV RBC AUTO: 100.6 FL (ref 80.5–98.2)
MONOCYTES # BLD AUTO: 1.11 10*3/MM3 (ref 0.2–1.2)
MONOCYTES NFR BLD AUTO: 8.5 % (ref 5–12)
NEUTROPHILS # BLD AUTO: 8.36 10*3/MM3 (ref 1.9–8.1)
NEUTROPHILS NFR BLD AUTO: 64 % (ref 42.7–76)
PLATELET # BLD AUTO: 358 10*3/MM3 (ref 140–500)
POTASSIUM SERPL-SCNC: 4.2 MMOL/L (ref 3.5–5.2)
PROT SERPL-MCNC: 6.7 G/DL (ref 6–8.5)
RBC # BLD AUTO: 4.64 10*6/MM3 (ref 3.9–5.2)
SODIUM SERPL-SCNC: 142 MMOL/L (ref 136–145)
T4 FREE SERPL-MCNC: 1.24 NG/DL (ref 0.93–1.7)
TRIGL SERPL-MCNC: 110 MG/DL (ref 0–150)
TSH SERPL DL<=0.005 MIU/L-ACNC: 3.75 MIU/ML (ref 0.27–4.2)
VLDLC SERPL CALC-MCNC: 22 MG/DL (ref 5–40)
WBC # BLD AUTO: 13.06 10*3/MM3 (ref 4.5–10.7)

## 2017-11-15 LAB
CRP SERPL-MCNC: 0.06 MG/DL (ref 0–0.5)
FSH SERPL-ACNC: 8 MIU/ML
LH SERPL-ACNC: 6.3 MIU/ML

## 2017-11-17 ENCOUNTER — OFFICE VISIT (OUTPATIENT)
Dept: FAMILY MEDICINE CLINIC | Facility: CLINIC | Age: 42
End: 2017-11-17

## 2017-11-17 VITALS
HEIGHT: 69 IN | DIASTOLIC BLOOD PRESSURE: 78 MMHG | SYSTOLIC BLOOD PRESSURE: 140 MMHG | HEART RATE: 89 BPM | WEIGHT: 180 LBS | OXYGEN SATURATION: 99 % | BODY MASS INDEX: 26.66 KG/M2

## 2017-11-17 DIAGNOSIS — F17.200 SMOKER: ICD-10-CM

## 2017-11-17 DIAGNOSIS — R06.09 DYSPNEA ON EXERTION: ICD-10-CM

## 2017-11-17 DIAGNOSIS — F31.9 BIPOLAR AFFECTIVE DISORDER, RAPID CYCLING (HCC): ICD-10-CM

## 2017-11-17 DIAGNOSIS — Z00.00 ANNUAL PHYSICAL EXAM: Primary | ICD-10-CM

## 2017-11-17 DIAGNOSIS — L30.9 DERMATITIS: ICD-10-CM

## 2017-11-17 DIAGNOSIS — K64.1 GRADE II HEMORRHOIDS: ICD-10-CM

## 2017-11-17 DIAGNOSIS — R00.2 HEART PALPITATIONS: ICD-10-CM

## 2017-11-17 DIAGNOSIS — K59.04 CHRONIC IDIOPATHIC CONSTIPATION: ICD-10-CM

## 2017-11-17 PROCEDURE — 99396 PREV VISIT EST AGE 40-64: CPT | Performed by: FAMILY MEDICINE

## 2017-11-17 RX ORDER — DEXTROAMPHETAMINE SACCHARATE, AMPHETAMINE ASPARTATE MONOHYDRATE, DEXTROAMPHETAMINE SULFATE AND AMPHETAMINE SULFATE 5; 5; 5; 5 MG/1; MG/1; MG/1; MG/1
CAPSULE, EXTENDED RELEASE ORAL
Refills: 0 | COMMUNITY
Start: 2017-11-03

## 2017-11-17 NOTE — PATIENT INSTRUCTIONS
This is a very nice 42-year-old who is here for annual exam but also has some other issues.  I will request consultations for the appropriate specialists.  I would like her to call if there is a problem.

## 2017-11-17 NOTE — PROGRESS NOTES
Procedure   Procedures     Adult ECG Report     Name: Nehal Roberts   Age: 42 y.o.   Gender: female       Rate: 76   Rhythm: normal sinus rhythm   QRS Axis: 80   CO Interval: 130   QRS Duration: 88   QTc:    Voltages:    Conduction Disturbances: none   Other Abnormalities: none     Narrative Interpretation: This EKG was done as part of her annual examination and to further evaluate her complaint of palpitations.  There are no old ones to compare this to but this shows a sinus rhythm with borderline long QT interval thought to be secondary to her medication.      X Ray report:    To evaluate her complaint of dyspnea on exertion and palpitations I have requested a chest x-ray.  There are no old ones to compare this to but this is a normal chest x-ray with no sign of cardiomegaly or pulmonary edema

## 2017-11-17 NOTE — PROGRESS NOTES
Subjective   Nehal Roberts is a 42 y.o. female presenting with   Chief Complaint   Patient presents with   • Annual Exam     lab results    • Edema     bialteral ankles    • Weight Gain   • Shortness of Breath   • Spots     on arms and hands and has white spot in the corner     • Numbness     hand mostly right hand and a few left fingers  hands are pinkish color     • Constipation        HPI Comments: This is a 42-year-old  white female who comes in today reportedly for annual examination but also has a list of items she wants to discuss that is 16 items long!.    The most worrisome of these items or palpitations with occasional tachycardia and PVCs and also dyspnea on exertion.  She tells me that she used to be able to work out intensely but now even walking up one flight of stairs causes shortness of breath.  She also says that occasionally when she is lying in bed at night her heart will beat 3 or 4 times fast and then will skip a short pause followed by a very hard heartbeat.  She says that she cut out caffeine to see if that will alleviate this and it did not.  Therefore she has come in for her annual and to be checked.    She also mentions that she has sores on her face that started when she borrowed a friend's makeup last summer and broke out in a rash and the areas have never healed.  She also says that she has 2 spots on her left forearm that are not healing.    She also says that occasionally her hands well turned pink colored, but she does not describe blue or white color to suggest Raynaud.    Shortness of Breath   Associated symptoms include leg swelling and a rash.   Constipation          The following portions of the patient's history were reviewed and updated as appropriate: current medications, past family history, past medical history, past social history, past surgical history and problem list.    Review of Systems   Constitutional: Positive for fatigue.   Respiratory: Positive for  shortness of breath.    Cardiovascular: Positive for palpitations and leg swelling.   Gastrointestinal: Positive for constipation.   Skin: Positive for rash.   All other systems reviewed and are negative.      Objective   Physical Exam   Constitutional: She is oriented to person, place, and time. She appears well-developed and well-nourished. No distress.   HENT:   Head: Normocephalic and atraumatic.   Right Ear: External ear normal.   Left Ear: External ear normal.   Mouth/Throat: Oropharynx is clear and moist. No oropharyngeal exudate.   Eyes: EOM are normal. Pupils are equal, round, and reactive to light. No scleral icterus.   Neck: Normal range of motion. Neck supple. No JVD present. No thyromegaly present.   Cardiovascular: Normal rate and regular rhythm.  Exam reveals no friction rub.    Murmur (2/6 systolic ejection murmur which is new) heard.  Pulmonary/Chest: Effort normal. No respiratory distress. She has no wheezes. She has rhonchi in the right upper field and the left upper field.   Abdominal: Soft. Bowel sounds are normal. She exhibits no distension and no mass. There is no tenderness. There is no guarding.   Musculoskeletal: Normal range of motion. She exhibits no edema or tenderness.   Lymphadenopathy:     She has no cervical adenopathy.   Neurological: She is alert and oriented to person, place, and time. No cranial nerve deficit. Coordination normal.   Skin: Skin is warm and dry. Rash (minor dermatitis on chin and 2 small red papules on the left arm) noted. She is not diaphoretic.   Psychiatric: She has a normal mood and affect. Her behavior is normal.   Nursing note and vitals reviewed.      Assessment/Plan   Nehal was seen today for annual exam, edema, weight gain, shortness of breath, spots, numbness and constipation.    Diagnoses and all orders for this visit:    Annual physical exam  -     ECG 12 Lead  -     XR Chest PA & Lateral    Heart palpitations  -     ECG 12 Lead  -     XR Chest PA &  Lateral    Dyspnea on exertion  -     ECG 12 Lead  -     XR Chest PA & Lateral    Bipolar affective disorder, rapid cycling    Smoker    Dermatitis                   I would like him to return for another visit in 6 month(s)

## 2017-12-21 ENCOUNTER — OFFICE VISIT (OUTPATIENT)
Dept: GASTROENTEROLOGY | Facility: CLINIC | Age: 42
End: 2017-12-21

## 2017-12-21 VITALS
WEIGHT: 179.4 LBS | HEIGHT: 69 IN | DIASTOLIC BLOOD PRESSURE: 82 MMHG | BODY MASS INDEX: 26.57 KG/M2 | TEMPERATURE: 98.4 F | SYSTOLIC BLOOD PRESSURE: 122 MMHG

## 2017-12-21 DIAGNOSIS — K64.1 GRADE II HEMORRHOIDS: ICD-10-CM

## 2017-12-21 DIAGNOSIS — K62.5 RECTAL BLEEDING: ICD-10-CM

## 2017-12-21 DIAGNOSIS — K59.04 CHRONIC IDIOPATHIC CONSTIPATION: Primary | ICD-10-CM

## 2017-12-21 PROCEDURE — 99204 OFFICE O/P NEW MOD 45 MIN: CPT | Performed by: INTERNAL MEDICINE

## 2017-12-21 RX ORDER — SODIUM CHLORIDE, SODIUM LACTATE, POTASSIUM CHLORIDE, CALCIUM CHLORIDE 600; 310; 30; 20 MG/100ML; MG/100ML; MG/100ML; MG/100ML
30 INJECTION, SOLUTION INTRAVENOUS CONTINUOUS
Status: CANCELLED | OUTPATIENT
Start: 2017-12-21

## 2017-12-21 NOTE — PROGRESS NOTES
Chief Complaint   Patient presents with   • Constipation   • Hemorrhoids       Subjective     HPI    Nehal Roberts is a 42 y.o. female with a past medical history noted below who presents for evaluation of constipation.  Symptoms present for at least three years.  She found that she was having a BM every 2-3 days.  Stools were hard.  She was initially able to control symptoms with probiotics which made her stools softer and larger at one time.  However she still would only intermittently have a BM.  She then had to add stool softeners as the probiotics seemed to stop working.  However, despite this she is still having hard, small, rock-like stools. She tried OTC laxatives but found this caused more bleeding and mucus and not much improvement in her constipation.  She initially thought her issues worse due to Lamictal but she weaned herself off of that and did not find any change.    She does have rectal bleeding that she attributes to hemorrhoids.  She sees this with larger BMs.  Both bright red blood and clots.  On the stool, with wiping, and in the toilet water.  She does have blood mixed with mucus.  There is no pain.  Recent labs show a stable and normal hemoglobin on November 14.  She does have a macrocytosis.    She has never had any imaging or endoscopy as part of workup for her symptoms.  She has a normal thyroid panel.    No family history of GI malignancy.  Smokes 1/2 pack daily.  Occasional ETOH.  Works as an .      Going to a cardiologist for irregular heartbeat and murmur.  Sees a cardiologist January 5th.    Past Medical History:   Diagnosis Date   • ADHD (attention deficit hyperactivity disorder)    • Allergic rhinitis    • Anemia    • Anxiety    • Asthma    • Cervical dysplasia     stage 0-1   • Constipation    • Headache          Current Outpatient Prescriptions:   •  albuterol (PROVENTIL HFA;VENTOLIN HFA) 108 (90 BASE) MCG/ACT inhaler, Inhale 2 puffs Every 4 (Four) Hours As Needed for  Wheezing or Shortness of Air., Disp: , Rfl:   •  amphetamine-dextroamphetamine XR (ADDERALL XR) 20 MG 24 hr capsule, TAKE ONE CAPSULE BY MOUTH TWICE A DAY, Disp: , Rfl: 0  •  Docusate Calcium (STOOL SOFTENER PO), Take  by mouth., Disp: , Rfl:   •  Probiotic Product (PROBIOTIC ADVANCED PO), Take  by mouth., Disp: , Rfl:   •  Sennosides (LAXATIVE PILLS PO), Take  by mouth., Disp: , Rfl:   •  hydrocortisone (ANUSOL-HC) 2.5 % rectal cream, Insert  into the rectum Daily., Disp: 30 g, Rfl: 2  •  lamoTRIgine (LaMICtal) 200 MG tablet, , Disp: , Rfl:   •  linaclotide (LINZESS) 72 MCG capsule capsule, Take 1 capsule by mouth Every Morning Before Breakfast., Disp: 30 capsule, Rfl: 2    Allergies   Allergen Reactions   • Doxycycline Other (See Comments)     Second degree burns on the inside   • Amoxicillin Itching and Rash       Social History     Social History   • Marital status:      Spouse name: N/A   • Number of children: N/A   • Years of education: N/A     Occupational History   • Not on file.     Social History Main Topics   • Smoking status: Current Every Day Smoker     Packs/day: 0.50     Years: 25.00     Types: Cigarettes   • Smokeless tobacco: Never Used   • Alcohol use Yes      Comment: social   • Drug use: No   • Sexual activity: Defer     Other Topics Concern   • Not on file     Social History Narrative       Family History   Problem Relation Age of Onset   • Arthritis Mother    • Depression Mother    • Hyperlipidemia Mother    • Hypertension Mother    • Sleep apnea Mother    • Anesthesia problems Mother    • Arthritis Father    • Depression Father    • Hyperlipidemia Father    • Mental illness Father    • Other Father      chronic constipation   • Mental illness Other        Review of Systems   Constitutional: Negative for activity change, appetite change and fatigue.   HENT: Negative for sore throat and trouble swallowing.    Respiratory: Negative.    Cardiovascular: Negative.    Gastrointestinal:  Positive for anal bleeding, blood in stool and constipation. Negative for abdominal distention and abdominal pain.   Endocrine: Negative for cold intolerance and heat intolerance.   Genitourinary: Negative for difficulty urinating, dysuria and frequency.   Musculoskeletal: Negative for arthralgias, back pain and myalgias.   Skin: Negative.    Hematological: Negative for adenopathy. Does not bruise/bleed easily.   All other systems reviewed and are negative.      Objective     Vitals:    12/21/17 0921   BP: 122/82   Temp: 98.4 °F (36.9 °C)     Last 2 weights    12/21/17 0921   Weight: 81.4 kg (179 lb 6.4 oz)     Body mass index is 26.49 kg/(m^2).    Physical Exam   Constitutional: She is oriented to person, place, and time. She appears well-developed and well-nourished. No distress.   HENT:   Head: Normocephalic and atraumatic.   Right Ear: External ear normal.   Left Ear: External ear normal.   Nose: Nose normal.   Mouth/Throat: Oropharynx is clear and moist.   Eyes: Conjunctivae and EOM are normal. Right eye exhibits no discharge. Left eye exhibits no discharge. No scleral icterus.   Neck: Normal range of motion. Neck supple. No thyromegaly present.   No supraclavicular adenopathy   Cardiovascular: Normal rate, regular rhythm, normal heart sounds and intact distal pulses.  Exam reveals no gallop.    No murmur heard.  No lower extremity edema   Pulmonary/Chest: Effort normal and breath sounds normal. No respiratory distress. She has no wheezes.   Abdominal: Soft. Normal appearance and bowel sounds are normal. She exhibits no distension and no mass. There is no hepatosplenomegaly. There is no tenderness. There is no rigidity, no rebound and no guarding.   Genitourinary:   Genitourinary Comments: Rectal exam deferred   Musculoskeletal: Normal range of motion. She exhibits no edema or tenderness.   No atrophy of upper or lower extremities.  Normal digits and nails of both hands.   Lymphadenopathy:     She has no  cervical adenopathy.   Neurological: She is alert and oriented to person, place, and time. She displays no atrophy. Coordination normal.   Skin: Skin is warm and dry. No rash noted. She is not diaphoretic. No erythema.   Psychiatric: She has a normal mood and affect. Her behavior is normal. Judgment and thought content normal.   Vitals reviewed.      WBC   Date Value Ref Range Status   11/14/2017 13.06 (H) 4.50 - 10.70 10*3/mm3 Final     RBC   Date Value Ref Range Status   11/14/2017 4.64 3.90 - 5.20 10*6/mm3 Final     Hemoglobin   Date Value Ref Range Status   11/14/2017 15.0 11.9 - 15.5 g/dL Final     Hematocrit   Date Value Ref Range Status   11/14/2017 46.7 (H) 35.6 - 45.5 % Final     MCV   Date Value Ref Range Status   11/14/2017 100.6 (H) 80.5 - 98.2 fL Final     MCH   Date Value Ref Range Status   11/14/2017 32.3 (H) 26.9 - 32.0 pg Final     MCHC   Date Value Ref Range Status   11/14/2017 32.1 (L) 32.4 - 36.3 g/dL Final     RDW   Date Value Ref Range Status   11/14/2017 13.2 (H) 11.7 - 13.0 % Final     Platelets   Date Value Ref Range Status   11/14/2017 358 140 - 500 10*3/mm3 Final     Neutrophil Rel %   Date Value Ref Range Status   11/14/2017 64.0 42.7 - 76.0 % Final     Lymphocyte Rel %   Date Value Ref Range Status   11/14/2017 20.1 19.6 - 45.3 % Final     Monocyte Rel %   Date Value Ref Range Status   11/14/2017 8.5 5.0 - 12.0 % Final     Eosinophil Rel %   Date Value Ref Range Status   11/14/2017 6.9 (H) 0.3 - 6.2 % Final     Basophil Rel %   Date Value Ref Range Status   11/14/2017 0.2 0.0 - 1.5 % Final     Neutrophils Absolute   Date Value Ref Range Status   11/14/2017 8.36 (H) 1.90 - 8.10 10*3/mm3 Final     Lymphocytes Absolute   Date Value Ref Range Status   11/14/2017 2.62 0.90 - 4.80 10*3/mm3 Final     Monocytes Absolute   Date Value Ref Range Status   11/14/2017 1.11 0.20 - 1.20 10*3/mm3 Final     Eosinophils Absolute   Date Value Ref Range Status   11/14/2017 0.90 (H) 0.00 - 0.70 10*3/mm3 Final      Basophils Absolute   Date Value Ref Range Status   11/14/2017 0.03 0.00 - 0.20 10*3/mm3 Final       Sodium   Date Value Ref Range Status   11/14/2017 142 136 - 145 mmol/L Final     Potassium   Date Value Ref Range Status   11/14/2017 4.2 3.5 - 5.2 mmol/L Final     Total CO2   Date Value Ref Range Status   11/14/2017 23.1 22.0 - 29.0 mmol/L Final     Chloride   Date Value Ref Range Status   11/14/2017 104 98 - 107 mmol/L Final     Creatinine   Date Value Ref Range Status   11/14/2017 0.85 0.57 - 1.00 mg/dL Final     BUN   Date Value Ref Range Status   11/14/2017 11 6 - 20 mg/dL Final     BUN/Creatinine Ratio   Date Value Ref Range Status   11/14/2017 12.9 7.0 - 25.0 Final     Calcium   Date Value Ref Range Status   11/14/2017 9.1 8.6 - 10.5 mg/dL Final     eGFR Non  Am   Date Value Ref Range Status   11/14/2017 74 >60 mL/min/1.73 Final     Alkaline Phosphatase   Date Value Ref Range Status   11/14/2017 56 39 - 117 U/L Final     ALT (SGPT)   Date Value Ref Range Status   11/14/2017 14 1 - 33 U/L Final     AST (SGOT)   Date Value Ref Range Status   11/14/2017 12 1 - 32 U/L Final     Total Bilirubin   Date Value Ref Range Status   11/14/2017 0.6 0.1 - 1.2 mg/dL Final     Albumin   Date Value Ref Range Status   11/14/2017 4.20 3.50 - 5.20 g/dL Final     A/G Ratio   Date Value Ref Range Status   11/14/2017 1.7 g/dL Final         Imaging Results (last 7 days)     ** No results found for the last 168 hours. **            No notes on file    Assessment/Plan    Chronic idiopathic constipation: Gradually worsening and becoming less responsive to the usual agents.    Hemorrhoids: attributes her bleeding to these, not on any topical therapy    Rectal bleeding: ? Hemorrhoidal vs other anal outlet issue.  Stable Hb    Plan  -start linzess 72mcg daily.  Adjust dosage as needed for soft and easy to pass stools  -anusol cream daily for bleeding  -colonoscopy for further evaluation of the rectal bleeding following her  cardiac evaluation    Nehal was seen today for constipation and hemorrhoids.    Diagnoses and all orders for this visit:    Chronic idiopathic constipation  -     linaclotide (LINZESS) 72 MCG capsule capsule; Take 1 capsule by mouth Every Morning Before Breakfast.    Grade II hemorrhoids  -     linaclotide (LINZESS) 72 MCG capsule capsule; Take 1 capsule by mouth Every Morning Before Breakfast.  -     hydrocortisone (ANUSOL-HC) 2.5 % rectal cream; Insert  into the rectum Daily.    Rectal bleeding  -     Case Request; Standing  -     Implement Anesthesia Orders Day of Procedure; Standing  -     Obtain Informed Consent; Standing  -     Verify bowel prep was successful; Standing  -     lactated ringers infusion; Infuse 30 mL/hr into a venous catheter Continuous.  -     Case Request        I have discussed the above plan with the patient.  They verbalize understanding and are in agreement with the plan.  They have been advised to contact the office for any questions, concerns, or changes related to their health.    Dictated utilizing Dragon dictation

## 2017-12-21 NOTE — PATIENT INSTRUCTIONS
Start linzess.  It may take a few days to see the effect of this medication    Topical anusol cream nightly    Schedule the colonoscopy later in January after your cardiology appointment    For any additional questions, concerns or changes to your condition after today's office visit please contact the office at [phone].

## 2018-01-05 ENCOUNTER — OFFICE VISIT (OUTPATIENT)
Dept: CARDIOLOGY | Facility: CLINIC | Age: 43
End: 2018-01-05

## 2018-01-05 VITALS
BODY MASS INDEX: 26.81 KG/M2 | SYSTOLIC BLOOD PRESSURE: 122 MMHG | DIASTOLIC BLOOD PRESSURE: 80 MMHG | HEIGHT: 69 IN | HEART RATE: 79 BPM | WEIGHT: 181 LBS

## 2018-01-05 DIAGNOSIS — R06.02 SOB (SHORTNESS OF BREATH): ICD-10-CM

## 2018-01-05 DIAGNOSIS — R07.2 PRECORDIAL PAIN: ICD-10-CM

## 2018-01-05 DIAGNOSIS — R00.2 HEART PALPITATIONS: Primary | ICD-10-CM

## 2018-01-05 PROCEDURE — 99203 OFFICE O/P NEW LOW 30 MIN: CPT | Performed by: INTERNAL MEDICINE

## 2018-01-05 PROCEDURE — 93000 ELECTROCARDIOGRAM COMPLETE: CPT | Performed by: INTERNAL MEDICINE

## 2018-01-05 NOTE — PROGRESS NOTES
Subjective:     Encounter Date:01/05/2018      Patient ID: Nehal Roberts is a 42 y.o. female.    Chief Complaint:  History of Present Illness    Dear Dr. Clark,    I had the pleasure of seeing this patient in the office today for initial evaluation and consultation.  I appreciate the you sent her to see us.    She is a pleasant female comes in with complaints of worsening dyspnea on exertion.  This is been going on for several months.  She denies any dyspnea at rest.  She's not had a cough.  If she tries to do any type of exercise or activity then she'll get very short of breath.  She also gets a mid precordial chest pressure at the same time that she gets the shortness of breath.  She'll stop and things get better over a minute or 2.  She's never felt any chest discomfort when she's not short of breath and she's never had any chest discomfort when she's not doing some exertion.  She started to notice this toward the in of a exercise class and then gradually she started having these problems earlier and earlier in the class.  He got to the point where she only do about 10 or 15 minutes and then she had to stop because he was so short of breath.  She's also been getting short of breath now she walks up steps.  She's had a little bit of occasional minimal edema in her ankles at times but that is not been a consistent finding and she's not had it recently.  She has noted that she's been gaining weight and she's gained 11 pounds over the last 2 months.  This seems to be more than she expected since she hasn't been able to exercise.  She has not had any orthopnea or PND.  No chills or fevers.    She also notes occasional palpitations.  These tend to bother her at night.  She'll be typically laying still and she'll feel like her heart pauses for an instant and then it beats kind of hard.  She just feels the single beat.  She wasn't worried about it, but given the recent progressive shortness of breath and chest  discomfort she wondered whether that was a sign as well.    She does smoke about one half pack of cigarettes a day.  No significant family history of CAD.    The following portions of the patient's history were reviewed and updated as appropriate: allergies, current medications, past family history, past medical history, past social history, past surgical history and problem list.    Past Medical History:   Diagnosis Date   • Acid reflux    • ADHD (attention deficit hyperactivity disorder)    • Allergic rhinitis    • Anemia    • Anxiety    • Asthma    • Cervical dysplasia     stage 0-1   • Constipation    • Headache        Past Surgical History:   Procedure Laterality Date   • BREAST CYST EXCISION Right    • D&C WITH SUCTION     • ENDOMETRIAL ABLATION     • ESSURE TUBAL LIGATION     • GALLBLADDER SURGERY  1996   • HYSTEROSCOPY ENDOMETRIAL ABLATION N/A 6/14/2017    Procedure: HYSTEROSCOPY WITH ENDOMETRIAL ABLATION;  Surgeon: Sarah Beth Ray DO;  Location: [facility];  Service:    • OTHER SURGICAL HISTORY      DENTAL SURGERY   • TONSILLECTOMY         Social History     Social History   • Marital status:      Spouse name: N/A   • Number of children: N/A   • Years of education: N/A     Occupational History   • Not on file.     Social History Main Topics   • Smoking status: Current Every Day Smoker     Packs/day: 0.50     Years: 25.00     Types: Cigarettes   • Smokeless tobacco: Never Used   • Alcohol use Yes      Comment: social   • Drug use: No   • Sexual activity: Defer     Other Topics Concern   • Not on file     Social History Narrative       Review of Systems   Constitution: Negative for chills, decreased appetite, fever and night sweats.   HENT: Negative for ear discharge, ear pain, hearing loss, nosebleeds and sore throat.    Eyes: Negative for blurred vision, double vision and pain.   Cardiovascular: Negative for cyanosis.   Respiratory: Negative for hemoptysis and sputum production.    Endocrine:  "Negative for cold intolerance and heat intolerance.   Hematologic/Lymphatic: Negative for adenopathy.   Skin: Negative for dry skin, itching, nail changes, rash and suspicious lesions.   Musculoskeletal: Negative for arthritis, gout, muscle cramps, muscle weakness, myalgias and neck pain.   Gastrointestinal: Negative for anorexia, bowel incontinence, constipation, diarrhea, dysphagia, hematemesis and jaundice.   Genitourinary: Negative for bladder incontinence, dysuria, flank pain, frequency, hematuria and nocturia.   Neurological: Negative for focal weakness, numbness, paresthesias and seizures.   Psychiatric/Behavioral: Negative for altered mental status, hallucinations, hypervigilance, suicidal ideas and thoughts of violence.   Allergic/Immunologic: Negative for persistent infections.         ECG 12 Lead  Date/Time: 1/5/2018 12:36 PM  Performed by: LUCIEN LEDEZMA III.  Authorized by: LUCIEN LEDEZMA III   Comparison: compared with previous ECG   Similar to previous ECG  Rhythm: sinus rhythm  Rate: normal  Conduction: conduction normal  ST Segments: ST segments normal  T Waves: T waves normal  QRS axis: normal  Other: no other findings  Clinical impression: normal ECG               Objective:     Vitals:    01/05/18 0839   BP: 122/80   Pulse: 79   Weight: 82.1 kg (181 lb)   Height: 175.3 cm (69\")         Physical Exam   Constitutional: She is oriented to person, place, and time. She appears well-developed and well-nourished. No distress.   HENT:   Head: Normocephalic and atraumatic.   Nose: Nose normal.   Mouth/Throat: Oropharynx is clear and moist.   Eyes: Conjunctivae and EOM are normal. Pupils are equal, round, and reactive to light. Right eye exhibits no discharge. Left eye exhibits no discharge.   Neck: Normal range of motion. Neck supple. No tracheal deviation present. No thyromegaly present.   Cardiovascular: Normal rate, regular rhythm, S1 normal, S2 normal, normal heart sounds and normal pulses.  Exam " reveals no S3.    Pulmonary/Chest: Effort normal and breath sounds normal. No stridor. No respiratory distress. She exhibits no tenderness.   Abdominal: Soft. Bowel sounds are normal. She exhibits no distension and no mass. There is no tenderness. There is no rebound and no guarding.   Musculoskeletal: Normal range of motion. She exhibits no tenderness or deformity.   Lymphadenopathy:     She has no cervical adenopathy.   Neurological: She is alert and oriented to person, place, and time. She has normal reflexes.   Skin: Skin is warm and dry. No rash noted. She is not diaphoretic. No erythema.   Psychiatric: She has a normal mood and affect. Thought content normal.       Lab Review:             Performed        Assessment:          Diagnosis Plan   1. Heart palpitations  Adult Transthoracic Echo Complete W/ Cont if Necessary Per Protocol    Treadmill Stress Test    ECG 12 Lead   2. SOB (shortness of breath)  ECG 12 Lead   3. Precordial pain  ECG 12 Lead          Plan:       1.  Exertional chest pain and shortness of breath.  We will arrange for an echocardiogram as well as an exercise treadmill test be performed.  2.  Palpitations he sound consistent with rare premature ectopy.  Thank you very much for allowing us to participate in the care of this pleasant patient.  Please don't hesitate to call if I can be of assistance in any way.      Current Outpatient Prescriptions:   •  albuterol (PROVENTIL HFA;VENTOLIN HFA) 108 (90 BASE) MCG/ACT inhaler, Inhale 2 puffs Every 4 (Four) Hours As Needed for Wheezing or Shortness of Air., Disp: , Rfl:   •  amphetamine-dextroamphetamine XR (ADDERALL XR) 20 MG 24 hr capsule, TAKE ONE CAPSULE BY MOUTH TWICE A DAY, Disp: , Rfl: 0  •  Docusate Calcium (STOOL SOFTENER PO), Take  by mouth., Disp: , Rfl:   •  hydrocortisone (ANUSOL-HC) 2.5 % rectal cream, Insert  into the rectum Daily., Disp: 30 g, Rfl: 2  •  lamoTRIgine (LaMICtal) 200 MG tablet, , Disp: , Rfl:   •  linaclotide (LINZESS)  72 MCG capsule capsule, Take 1 capsule by mouth Every Morning Before Breakfast., Disp: 30 capsule, Rfl: 2  •  Probiotic Product (PROBIOTIC ADVANCED PO), Take  by mouth., Disp: , Rfl:   •  Sennosides (LAXATIVE PILLS PO), Take  by mouth., Disp: , Rfl:

## 2018-01-12 ENCOUNTER — HOSPITAL ENCOUNTER (OUTPATIENT)
Dept: CARDIOLOGY | Facility: HOSPITAL | Age: 43
Discharge: HOME | End: 2018-01-12
Attending: INTERNAL MEDICINE | Admitting: INTERNAL MEDICINE

## 2018-01-12 ENCOUNTER — HOSPITAL ENCOUNTER (OUTPATIENT)
Dept: CARDIOLOGY | Facility: HOSPITAL | Age: 43
Discharge: HOME | End: 2018-01-12
Attending: INTERNAL MEDICINE

## 2018-01-12 VITALS
SYSTOLIC BLOOD PRESSURE: 110 MMHG | HEART RATE: 75 BPM | DIASTOLIC BLOOD PRESSURE: 70 MMHG | BODY MASS INDEX: 26.81 KG/M2 | WEIGHT: 181 LBS | HEIGHT: 69 IN

## 2018-01-12 DIAGNOSIS — R00.2 HEART PALPITATIONS: ICD-10-CM

## 2018-01-12 LAB
BH CV ECHO MEAS - ACS: 2 CM
BH CV ECHO MEAS - AO MAX PG (FULL): 1.2 MMHG
BH CV ECHO MEAS - AO MAX PG: 4.7 MMHG
BH CV ECHO MEAS - AO MEAN PG (FULL): 1.3 MMHG
BH CV ECHO MEAS - AO MEAN PG: 3.1 MMHG
BH CV ECHO MEAS - AO ROOT AREA (BSA CORRECTED): 1.4
BH CV ECHO MEAS - AO ROOT AREA: 6.2 CM^2
BH CV ECHO MEAS - AO ROOT DIAM: 2.8 CM
BH CV ECHO MEAS - AO V2 MAX: 108.6 CM/SEC
BH CV ECHO MEAS - AO V2 MEAN: 82.8 CM/SEC
BH CV ECHO MEAS - AO V2 VTI: 25.4 CM
BH CV ECHO MEAS - AVA(I,A): 1.9 CM^2
BH CV ECHO MEAS - AVA(I,D): 1.9 CM^2
BH CV ECHO MEAS - AVA(V,A): 2.2 CM^2
BH CV ECHO MEAS - AVA(V,D): 2.2 CM^2
BH CV ECHO MEAS - BSA(HAYCOCK): 2 M^2
BH CV ECHO MEAS - BSA: 2 M^2
BH CV ECHO MEAS - BZI_BMI: 26.7 KILOGRAMS/M^2
BH CV ECHO MEAS - BZI_METRIC_HEIGHT: 175.3 CM
BH CV ECHO MEAS - BZI_METRIC_WEIGHT: 82.1 KG
BH CV ECHO MEAS - CONTRAST EF (2CH): 61.7 ML/M^2
BH CV ECHO MEAS - CONTRAST EF 4CH: 66.1 ML/M^2
BH CV ECHO MEAS - EDV(MOD-SP2): 47 ML
BH CV ECHO MEAS - EDV(MOD-SP4): 56 ML
BH CV ECHO MEAS - EDV(TEICH): 89.5 ML
BH CV ECHO MEAS - EF(CUBED): 77.9 %
BH CV ECHO MEAS - EF(MOD-SP2): 61.7 %
BH CV ECHO MEAS - EF(MOD-SP4): 66.1 %
BH CV ECHO MEAS - EF(TEICH): 70.2 %
BH CV ECHO MEAS - ESV(MOD-SP2): 18 ML
BH CV ECHO MEAS - ESV(MOD-SP4): 19 ML
BH CV ECHO MEAS - ESV(TEICH): 26.6 ML
BH CV ECHO MEAS - FS: 39.5 %
BH CV ECHO MEAS - IVS/LVPW: 0.96
BH CV ECHO MEAS - IVSD: 0.86 CM
BH CV ECHO MEAS - LAT PEAK E' VEL: 13 CM/SEC
BH CV ECHO MEAS - LV DIASTOLIC VOL/BSA (35-75): 28.3 ML/M^2
BH CV ECHO MEAS - LV MASS(C)D: 125.4 GRAMS
BH CV ECHO MEAS - LV MASS(C)DI: 63.3 GRAMS/M^2
BH CV ECHO MEAS - LV MAX PG: 3.5 MMHG
BH CV ECHO MEAS - LV MEAN PG: 1.8 MMHG
BH CV ECHO MEAS - LV SYSTOLIC VOL/BSA (12-30): 9.6 ML/M^2
BH CV ECHO MEAS - LV V1 MAX: 93.1 CM/SEC
BH CV ECHO MEAS - LV V1 MEAN: 61.1 CM/SEC
BH CV ECHO MEAS - LV V1 VTI: 18.9 CM
BH CV ECHO MEAS - LVIDD: 4.4 CM
BH CV ECHO MEAS - LVIDS: 2.7 CM
BH CV ECHO MEAS - LVLD AP2: 6.5 CM
BH CV ECHO MEAS - LVLD AP4: 6.5 CM
BH CV ECHO MEAS - LVLS AP2: 5.5 CM
BH CV ECHO MEAS - LVLS AP4: 5.7 CM
BH CV ECHO MEAS - LVOT AREA (M): 2.5 CM^2
BH CV ECHO MEAS - LVOT AREA: 2.5 CM^2
BH CV ECHO MEAS - LVOT DIAM: 1.8 CM
BH CV ECHO MEAS - LVPWD: 0.89 CM
BH CV ECHO MEAS - MED PEAK E' VEL: 8 CM/SEC
BH CV ECHO MEAS - MV A DUR: 0.08 SEC
BH CV ECHO MEAS - MV A MAX VEL: 66.9 CM/SEC
BH CV ECHO MEAS - MV DEC SLOPE: 392.3 CM/SEC^2
BH CV ECHO MEAS - MV DEC TIME: 0.17 SEC
BH CV ECHO MEAS - MV E MAX VEL: 62.1 CM/SEC
BH CV ECHO MEAS - MV E/A: 0.93
BH CV ECHO MEAS - MV MAX PG: 2.1 MMHG
BH CV ECHO MEAS - MV MEAN PG: 1.3 MMHG
BH CV ECHO MEAS - MV P1/2T MAX VEL: 64 CM/SEC
BH CV ECHO MEAS - MV P1/2T: 47.8 MSEC
BH CV ECHO MEAS - MV V2 MAX: 73 CM/SEC
BH CV ECHO MEAS - MV V2 MEAN: 53.9 CM/SEC
BH CV ECHO MEAS - MV V2 VTI: 18 CM
BH CV ECHO MEAS - MVA P1/2T LCG: 3.4 CM^2
BH CV ECHO MEAS - MVA(P1/2T): 4.6 CM^2
BH CV ECHO MEAS - MVA(VTI): 2.7 CM^2
BH CV ECHO MEAS - PA MAX PG (FULL): 1.9 MMHG
BH CV ECHO MEAS - PA MAX PG: 3.3 MMHG
BH CV ECHO MEAS - PA V2 MAX: 90.9 CM/SEC
BH CV ECHO MEAS - PULM A REVS DUR: 0.09 SEC
BH CV ECHO MEAS - PULM A REVS VEL: 33 CM/SEC
BH CV ECHO MEAS - PULM DIAS VEL: 59.7 CM/SEC
BH CV ECHO MEAS - PULM S/D: 0.98
BH CV ECHO MEAS - PULM SYS VEL: 58.2 CM/SEC
BH CV ECHO MEAS - PVA(V,A): 1.7 CM^2
BH CV ECHO MEAS - PVA(V,D): 1.7 CM^2
BH CV ECHO MEAS - QP/QS: 0.59
BH CV ECHO MEAS - RV MAX PG: 1.4 MMHG
BH CV ECHO MEAS - RV MEAN PG: 0.67 MMHG
BH CV ECHO MEAS - RV V1 MAX: 60.1 CM/SEC
BH CV ECHO MEAS - RV V1 MEAN: 36.4 CM/SEC
BH CV ECHO MEAS - RV V1 VTI: 10.9 CM
BH CV ECHO MEAS - RVOT AREA: 2.6 CM^2
BH CV ECHO MEAS - RVOT DIAM: 1.8 CM
BH CV ECHO MEAS - SI(AO): 79.5 ML/M^2
BH CV ECHO MEAS - SI(CUBED): 34.4 ML/M^2
BH CV ECHO MEAS - SI(LVOT): 24.2 ML/M^2
BH CV ECHO MEAS - SI(MOD-SP2): 14.6 ML/M^2
BH CV ECHO MEAS - SI(MOD-SP4): 18.7 ML/M^2
BH CV ECHO MEAS - SI(TEICH): 31.8 ML/M^2
BH CV ECHO MEAS - SUP REN AO DIAM: 1.5 CM
BH CV ECHO MEAS - SV(AO): 157.4 ML
BH CV ECHO MEAS - SV(CUBED): 68.1 ML
BH CV ECHO MEAS - SV(LVOT): 47.9 ML
BH CV ECHO MEAS - SV(MOD-SP2): 29 ML
BH CV ECHO MEAS - SV(MOD-SP4): 37 ML
BH CV ECHO MEAS - SV(RVOT): 28.2 ML
BH CV ECHO MEAS - SV(TEICH): 62.9 ML
BH CV ECHO MEAS - TAPSE (>1.6): 2.2 CM2
BH CV STRESS BP STAGE 1: NORMAL
BH CV STRESS BP STAGE 2: NORMAL
BH CV STRESS BP STAGE 3: NORMAL
BH CV STRESS DURATION MIN STAGE 1: 3
BH CV STRESS DURATION MIN STAGE 2: 3
BH CV STRESS DURATION MIN STAGE 3: 3
BH CV STRESS DURATION SEC STAGE 1: 0
BH CV STRESS DURATION SEC STAGE 2: 0
BH CV STRESS DURATION SEC STAGE 3: 0
BH CV STRESS GRADE STAGE 1: 10
BH CV STRESS GRADE STAGE 2: 12
BH CV STRESS GRADE STAGE 3: 14
BH CV STRESS HR STAGE 1: 114
BH CV STRESS HR STAGE 2: 129
BH CV STRESS HR STAGE 3: 156
BH CV STRESS METS STAGE 1: 5
BH CV STRESS METS STAGE 2: 7.5
BH CV STRESS METS STAGE 3: 10
BH CV STRESS PROTOCOL 1: NORMAL
BH CV STRESS RECOVERY BP: NORMAL MMHG
BH CV STRESS RECOVERY HR: 98 BPM
BH CV STRESS SPEED STAGE 1: 1.7
BH CV STRESS SPEED STAGE 2: 2.5
BH CV STRESS SPEED STAGE 3: 3.4
BH CV STRESS STAGE 1: 1
BH CV STRESS STAGE 2: 2
BH CV STRESS STAGE 3: 3
BH CV XLRA - RV BASE: 2.5 CM
BH CV XLRA - TDI S': 12 CM/SEC
E/E' RATIO: 6
LEFT ATRIUM VOLUME INDEX: 32 ML/M2
LEFT ATRIUM VOLUME: 32 CM3
LV EF 2D ECHO EST: 66 %
MAXIMAL PREDICTED HEART RATE: 180 BPM
MAXIMAL PREDICTED HEART RATE: 180 BPM
PERCENT MAX PREDICTED HR: 86.67 %
STRESS BASELINE BP: NORMAL MMHG
STRESS BASELINE HR: 90 BPM
STRESS PERCENT HR: 102 %
STRESS POST ESTIMATED WORKLOAD: 10 METS
STRESS POST EXERCISE DUR MIN: 9 MIN
STRESS POST EXERCISE DUR SEC: 0 SEC
STRESS POST PEAK BP: NORMAL MMHG
STRESS POST PEAK HR: 156 BPM
STRESS TARGET HR: 153 BPM
STRESS TARGET HR: 153 BPM

## 2018-01-12 PROCEDURE — 93016 CV STRESS TEST SUPVJ ONLY: CPT | Performed by: INTERNAL MEDICINE

## 2018-01-12 PROCEDURE — 93018 CV STRESS TEST I&R ONLY: CPT | Performed by: INTERNAL MEDICINE

## 2018-01-12 PROCEDURE — 93306 TTE W/DOPPLER COMPLETE: CPT | Performed by: INTERNAL MEDICINE

## 2018-01-12 PROCEDURE — 93017 CV STRESS TEST TRACING ONLY: CPT

## 2018-01-12 PROCEDURE — 93306 TTE W/DOPPLER COMPLETE: CPT

## 2018-01-15 ENCOUNTER — TELEPHONE (OUTPATIENT)
Dept: GASTROENTEROLOGY | Facility: CLINIC | Age: 43
End: 2018-01-15

## 2018-01-15 DIAGNOSIS — R53.82 CHRONIC FATIGUE: Primary | ICD-10-CM

## 2018-01-15 NOTE — TELEPHONE ENCOUNTER
----- Message from Nehal Roberts sent at 1/12/2018  3:42 PM EST -----  Regarding: Non-Urgent Medical Question  Contact: [phone]  Dr. Clark has sent me to the dermatologist, you and a cardiologist. Everything individual has turned out as a non-issue. However, I have posed this same question to Dr. Clark since I have complained about all of these to him or various doctors more and more over time; is all my issues really just a thyroid problem?  I have researched all of my different symptoms after dermatologist wasn't worried, the cardiologist wasn't worried after ekg’s, echo’s and stress tests and a colonoscopy was ordered by you.     I have complained for a couple years about many of these symptoms and compared the results of all my tests over 2-3 years. The one that could point to thyroid has almost doubled or doubled and has been higher and higher every time. My dad struggles with his thyroid, my mom has fibromyalgia. Please review my different panels and tests from 2015 to present.     The below is what I have been dealing with for some time and discussed with Dr. Clark on more than one occasion.     Fatigue  Bipolar tendencies and ADHD – medicated for this now.   Increased sensitivity to cold / Cold hands and feet ALWAYS  Severe Constipation / Hemorrhoids / Hard stools / Excess gas- WORSE and WORSE!  Weight gain / Inability to lose weight  Muscle weakness / Muscle aches, tenderness and stiffness  Pain, stiffness or swelling in your joints / Swelling in Legs, Feet, Ankles  Heavier than normal menstrual periods - I ended up having an ablation done to try and fix this!  Thinning hair - fills the drain  Skipped heartbeats / Heart flutters / Heart palpitations / Chest pain  High cholesterol / High triglycerides / High LDL (bad) cholesterol  Anemia – yep, yep yep  Night sweats  Susceptibility to bronchitis - for years.   Hard time recovering from infections - yep! / Low immune system  Recurrent sinus infections  - even had sinus surgery because of this  Recurrent skin infections/Slow wound healing - face and sores take months and months to heal.  Bumps on legs / Moles and warty growths / Acne and Breakouts on the chest and the armsDry skin / Boils and Eczema  Candida (yeast) - get these infections easily  Pelvic Inflammatory Disease (PID) - had after a miscarriage years ago  Upper respiratory tract infections - this is a common diagnosis for me too  Pain and tenderness in the neck and/or thyroid area  Difficulty taking deep breaths / Asthma / Shortness of breath / Tightness in chest  Salt cravings / Food allergies / Acid Reflux  Propensity for cavities / Swollen gums / Persistent teeth clenching / TMJ  Noises in the ears (hissing, ringing) - this has gotten persistent   Internal itching of ears / Excess earwax - constantly  Achy eyes / Blurred vision /Problems with night vision / Frequent eye tics / Eyelid spasms    Gallbladder Disease / Gallstones  Chronic allergies / Chemical sensitivities  Loss of libido – NONEXISTENT / Vaginal dryness / Painful sex  Tingling and Numbness in the hands! / Carpal Tunnel Syndrome (hands or forearms)    My symptoms are consistent with thyroid dysfunction and my TSH is greater than 2, which although considered in the 'normal' range by many laboratories and physician but the most well-done studies demonstrate that a TSH above 2 is not likely 'normal'. It is recommended to seek a knowledgeable physician who understands the limitations of the TSH to discuss treatment.    Recommended Additional Testing:  Free T3  T3  Reverse T3  Thyroid peroxidase antibody(TPO)  Antithyroglobulin antibody  Sex hormone-binding globulin(SHBG)    Who do I see to have this considered? I thought I was crazy dealing with these different symptoms for so long and going to umpteen different doctors when it could of just been one issue all along?    I think there is an endocrinologist at your practice?    Please  advise!  Nehal Roberts

## 2018-01-15 NOTE — TELEPHONE ENCOUNTER
There is no endocrinologist in our practice but options include Dr Gramajo, Juan, or Jermain or maybe Dr Clark has other recommendations for her.

## 2018-01-16 NOTE — TELEPHONE ENCOUNTER
PT RETURNED YOUR CALL   Received: Today       Ashlyn Zhu RN; Desirae Coker RN                     ADVISED PT OF NOTE ABOUT ENDOCRINOLOGIST. PT SAID OK. WILL CHECK WITH DR LAYTON TO SEE WHAT OTHER TEST HE WANT ORDER. PLEASE CALL CALL HER IF YOU ALL NEED TO SPEAK TO HER. THANKS

## 2018-01-17 ENCOUNTER — ANESTHESIA EVENT (OUTPATIENT)
Dept: GASTROENTEROLOGY | Facility: HOSPITAL | Age: 43
End: 2018-01-17

## 2018-01-17 ENCOUNTER — HOSPITAL ENCOUNTER (OUTPATIENT)
Facility: HOSPITAL | Age: 43
Setting detail: HOSPITAL OUTPATIENT SURGERY
Discharge: HOME | End: 2018-01-17
Attending: INTERNAL MEDICINE | Admitting: INTERNAL MEDICINE

## 2018-01-17 ENCOUNTER — ANESTHESIA (OUTPATIENT)
Dept: GASTROENTEROLOGY | Facility: HOSPITAL | Age: 43
End: 2018-01-17

## 2018-01-17 VITALS
HEART RATE: 92 BPM | OXYGEN SATURATION: 96 % | WEIGHT: 177.8 LBS | HEIGHT: 69 IN | TEMPERATURE: 97.6 F | BODY MASS INDEX: 26.33 KG/M2 | DIASTOLIC BLOOD PRESSURE: 85 MMHG | RESPIRATION RATE: 16 BRPM | SYSTOLIC BLOOD PRESSURE: 114 MMHG

## 2018-01-17 DIAGNOSIS — K62.5 RECTAL BLEEDING: ICD-10-CM

## 2018-01-17 PROCEDURE — 45385 COLONOSCOPY W/LESION REMOVAL: CPT | Performed by: INTERNAL MEDICINE

## 2018-01-17 PROCEDURE — 88305 TISSUE EXAM BY PATHOLOGIST: CPT | Performed by: INTERNAL MEDICINE

## 2018-01-17 PROCEDURE — 25010000002 PROPOFOL 10 MG/ML EMULSION: Performed by: NURSE ANESTHETIST, CERTIFIED REGISTERED

## 2018-01-17 PROCEDURE — 25010000002 GLUCAGON (HUMAN RECOMBINANT) 1 MG RECONSTITUTED SOLUTION: Performed by: NURSE ANESTHETIST, CERTIFIED REGISTERED

## 2018-01-17 RX ORDER — PROPOFOL 10 MG/ML
VIAL (ML) INTRAVENOUS AS NEEDED
Status: DISCONTINUED | OUTPATIENT
Start: 2018-01-17 | End: 2018-01-17 | Stop reason: SURG

## 2018-01-17 RX ORDER — LIDOCAINE HYDROCHLORIDE 20 MG/ML
INJECTION, SOLUTION INFILTRATION; PERINEURAL AS NEEDED
Status: DISCONTINUED | OUTPATIENT
Start: 2018-01-17 | End: 2018-01-17 | Stop reason: SURG

## 2018-01-17 RX ORDER — PROPOFOL 10 MG/ML
VIAL (ML) INTRAVENOUS CONTINUOUS PRN
Status: DISCONTINUED | OUTPATIENT
Start: 2018-01-17 | End: 2018-01-17 | Stop reason: SURG

## 2018-01-17 RX ORDER — SODIUM CHLORIDE 0.9 % (FLUSH) 0.9 %
1-10 SYRINGE (ML) INJECTION AS NEEDED
Status: DISCONTINUED | OUTPATIENT
Start: 2018-01-17 | End: 2018-01-17 | Stop reason: HOSPADM

## 2018-01-17 RX ORDER — SODIUM CHLORIDE, SODIUM LACTATE, POTASSIUM CHLORIDE, CALCIUM CHLORIDE 600; 310; 30; 20 MG/100ML; MG/100ML; MG/100ML; MG/100ML
30 INJECTION, SOLUTION INTRAVENOUS CONTINUOUS
Status: DISCONTINUED | OUTPATIENT
Start: 2018-01-17 | End: 2018-01-17 | Stop reason: HOSPADM

## 2018-01-17 RX ADMIN — PROPOFOL 200 MCG/KG/MIN: 10 INJECTION, EMULSION INTRAVENOUS at 09:01

## 2018-01-17 RX ADMIN — LIDOCAINE HYDROCHLORIDE 60 MG: 20 INJECTION, SOLUTION INFILTRATION; PERINEURAL at 09:01

## 2018-01-17 RX ADMIN — SODIUM CHLORIDE, POTASSIUM CHLORIDE, SODIUM LACTATE AND CALCIUM CHLORIDE 30 ML/HR: 600; 310; 30; 20 INJECTION, SOLUTION INTRAVENOUS at 08:54

## 2018-01-17 RX ADMIN — PROPOFOL 100 MG: 10 INJECTION, EMULSION INTRAVENOUS at 09:01

## 2018-01-17 RX ADMIN — GLUCAGON HYDROCHLORIDE 1 MG: KIT at 09:19

## 2018-01-17 NOTE — ANESTHESIA PREPROCEDURE EVALUATION
Anesthesia Evaluation     Patient summary reviewed and Nursing notes reviewed   NPO Solid Status: > 8 hours  NPO Liquid Status: > 8 hours     Airway   Dental      Pulmonary    (+) asthma, shortness of breath,   Cardiovascular         Neuro/Psych  (+) headaches, psychiatric history Anxiety and Bipolar,     GI/Hepatic/Renal/Endo    (+)  GERD,     Musculoskeletal     Abdominal    Substance History      OB/GYN          Other                                                Anesthesia Plan    ASA 2     MAC     Anesthetic plan and risks discussed with patient.

## 2018-01-17 NOTE — ANESTHESIA POSTPROCEDURE EVALUATION
"Patient: Nehal Roberts    Procedure Summary     Date Anesthesia Start Anesthesia Stop Room / Location    01/17/18 0900 0937  [facility]       Procedure Diagnosis Surgeon Provider    COLONOSCOPY to cecum and t.i. with cold snare polypectomy (N/A ) Rectal bleeding  (Rectal bleeding [K62.5]) MD Etienne Mccullough MD          Anesthesia Type: MAC  Last vitals  BP   104/75 (01/17/18 0937)   Temp   37.2 °C (98.9 °F) (01/17/18 0850)   Pulse   85 (01/17/18 0937)   Resp   16 (01/17/18 0937)     SpO2   100 % (01/17/18 0937)     Post Anesthesia Care and Evaluation    Patient location during evaluation: PACU  Patient participation: complete - patient participated  Level of consciousness: awake and alert  Pain management: adequate  Airway patency: patent  Anesthetic complications: No anesthetic complications    Cardiovascular status: acceptable  Respiratory status: acceptable  Hydration status: acceptable    Comments: /75 (BP Location: Left arm, Patient Position: Lying)  Pulse 85  Temp 37.2 °C (98.9 °F) (Oral)   Resp 16  Ht 175.3 cm (69\")  Wt 80.6 kg (177 lb 12.8 oz)  SpO2 100%  BMI 26.26 kg/m2      "

## 2018-01-17 NOTE — PLAN OF CARE
Problem: Patient Care Overview (Adult)  Goal: Plan of Care Review  Outcome: Ongoing (interventions implemented as appropriate)   01/17/18 0838   Coping/Psychosocial Response Interventions   Plan Of Care Reviewed With patient     Goal: Adult Individualization and Mutuality  Outcome: Ongoing (interventions implemented as appropriate)   01/17/18 0838   Individualization   Patient Specific Preferences Nehal     Goal: Discharge Needs Assessment  Outcome: Ongoing (interventions implemented as appropriate)   01/17/18 0838   Discharge Needs Assessment   Concerns To Be Addressed no discharge needs identified   Discharge Disposition home or self-care   Living Environment   Transportation Available car;family or friend will provide       Problem: GI Endoscopy (Adult)  Intervention: Monitor/Manage Procedure Recovery   01/17/18 0838   Respiratory Interventions   Airway/Ventilation Management airway patency maintained   Coping/Psychosocial Interventions   Environmental Support calm environment promoted   Activity   Activity Type activity adjusted per tolerance   Cardiac Interventions   Warming Thermoregulation Maintenance warm blankets applied     Intervention: Prevent Cordelia-procedural Injury   01/17/18 0838   Positioning   Positioning side lying, left   Head Of Bed (HOB) Position HOB elevated       Goal: Signs and Symptoms of Listed Potential Problems Will be Absent or Manageable (GI Endoscopy)  Outcome: Ongoing (interventions implemented as appropriate)   01/17/18 0838   GI Endoscopy   Problems Assessed (GI Endoscopy) all

## 2018-01-18 ENCOUNTER — TELEPHONE (OUTPATIENT)
Dept: GASTROENTEROLOGY | Facility: CLINIC | Age: 43
End: 2018-01-18

## 2018-01-18 LAB
CYTO UR: NORMAL
LAB AP CASE REPORT: NORMAL
Lab: NORMAL
PATH REPORT.FINAL DX SPEC: NORMAL
PATH REPORT.GROSS SPEC: NORMAL

## 2018-01-18 NOTE — TELEPHONE ENCOUNTER
----- Message from Sarah Velasco MD sent at 1/18/2018  1:11 PM EST -----  The ascending colon polyp was a tubular adenoma.  In absence of symptoms, her next colonoscopy should be in 5 years.  Please place in recall.

## 2018-01-18 NOTE — PROGRESS NOTES
The ascending colon polyp was a tubular adenoma.  In absence of symptoms, her next colonoscopy should be in 5 years.  Please place in recall.

## 2018-01-20 ENCOUNTER — RESULTS ENCOUNTER (OUTPATIENT)
Dept: FAMILY MEDICINE CLINIC | Facility: CLINIC | Age: 43
End: 2018-01-20

## 2018-01-20 DIAGNOSIS — R53.82 CHRONIC FATIGUE: ICD-10-CM

## 2018-01-22 NOTE — TELEPHONE ENCOUNTER
Called pt and advised per Dr Velasco that the ascending colon polyp was a tubular adenoma. In absence of symptoms, her next c/s should be in 5 yrs.  Pt verb understanding.

## 2018-02-05 LAB
SHBG SERPL-SCNC: 72.6 NMOL/L (ref 24.6–122)
T3FREE SERPL-MCNC: 2.9 PG/ML (ref 2–4.4)
T3REVERSE SERPL-MCNC: 20.5 NG/DL (ref 9.2–24.1)
THYROGLOB SERPL-MCNC: 8.3 NG/ML

## 2018-02-22 ENCOUNTER — TELEPHONE (OUTPATIENT)
Dept: GASTROENTEROLOGY | Facility: CLINIC | Age: 43
End: 2018-02-22

## 2018-02-22 DIAGNOSIS — K64.1 GRADE II HEMORRHOIDS: ICD-10-CM

## 2018-02-22 DIAGNOSIS — K59.04 CHRONIC IDIOPATHIC CONSTIPATION: ICD-10-CM

## 2018-02-22 NOTE — TELEPHONE ENCOUNTER
----- Message from Holly Chaparro sent at 2/22/2018 12:17 PM EST -----  Regarding: Pinky calling from [facility]  Contact: [phone]  Pharm called stated they sent over a request to change her medication linzess to a 90 days supply.

## 2018-05-14 ENCOUNTER — TELEPHONE (OUTPATIENT)
Dept: FAMILY MEDICINE CLINIC | Facility: CLINIC | Age: 43
End: 2018-05-14

## 2018-05-14 NOTE — TELEPHONE ENCOUNTER
----- Message from Desiree Frias LPN sent at 5/14/2018  2:31 PM EDT -----  Regarding: FW: Referral Request  Contact: [phone]      ----- Message -----  From: Nehal Roberts  Sent: 5/14/2018   2:27 PM  To: [facility]  Subject: Referral Request                                 I would like a referral for one of the below:    Gina Cordoba MD   [address]  [facility] [address]  Phone [phone]    OR    Katy Ma MD  [address]  [address]  [place]  [address]  Phone [phone]    Please let me know if this can be approved.     Nehal Roberts  [phone]  [email or web address]

## 2018-05-14 NOTE — TELEPHONE ENCOUNTER
----- Message from Desiree Frias LPN sent at 5/14/2018  2:31 PM EDT -----  Regarding: FW: Referral Request  Contact: [phone]      ----- Message -----  From: Nehal Roberts  Sent: 5/14/2018   2:27 PM  To: [facility]  Subject: Referral Request                                 I would like a referral for one of the below:    Gina Cordoba MD   [address]  [facility] [address]  Phone [phone]    OR    Katy Ma MD  [address]  [address]  [place]  [address]  Phone [phone]    Please let me know if this can be approved.     Nehal Roberts  [phone]  [email or web address]

## 2018-06-13 DIAGNOSIS — E03.9 HYPOTHYROIDISM, UNSPECIFIED TYPE: Primary | ICD-10-CM

## 2018-10-15 ENCOUNTER — OFFICE VISIT (OUTPATIENT)
Dept: FAMILY MEDICINE CLINIC | Facility: CLINIC | Age: 43
End: 2018-10-15

## 2018-10-15 VITALS
WEIGHT: 180.2 LBS | BODY MASS INDEX: 26.69 KG/M2 | OXYGEN SATURATION: 98 % | DIASTOLIC BLOOD PRESSURE: 74 MMHG | TEMPERATURE: 98 F | HEIGHT: 69 IN | HEART RATE: 100 BPM | RESPIRATION RATE: 18 BRPM | SYSTOLIC BLOOD PRESSURE: 110 MMHG

## 2018-10-15 DIAGNOSIS — M79.10 MYALGIA: ICD-10-CM

## 2018-10-15 DIAGNOSIS — R60.9 EDEMA, UNSPECIFIED TYPE: ICD-10-CM

## 2018-10-15 DIAGNOSIS — R53.83 FATIGUE, UNSPECIFIED TYPE: Primary | ICD-10-CM

## 2018-10-15 DIAGNOSIS — M25.50 ARTHRALGIA, UNSPECIFIED JOINT: ICD-10-CM

## 2018-10-15 DIAGNOSIS — G60.9 IDIOPATHIC PERIPHERAL NEUROPATHY: ICD-10-CM

## 2018-10-15 PROCEDURE — 99214 OFFICE O/P EST MOD 30 MIN: CPT | Performed by: FAMILY MEDICINE

## 2018-10-15 RX ORDER — CLINDAMYCIN PHOSPHATE 10 MG/G
GEL TOPICAL
COMMUNITY
Start: 2017-11-01 | End: 2018-10-15 | Stop reason: ALTCHOICE

## 2018-10-15 RX ORDER — CHOLECALCIFEROL (VITAMIN D3) 1250 MCG
CAPSULE ORAL
Qty: 12 CAPSULE | Refills: 0 | Status: SHIPPED | OUTPATIENT
Start: 2018-10-15 | End: 2019-01-11 | Stop reason: SDUPTHER

## 2018-10-15 RX ORDER — MECOBALAMIN 5000 MCG
5000 TABLET,DISINTEGRATING ORAL DAILY
COMMUNITY
Start: 2017-11-23 | End: 2018-10-15

## 2018-10-15 RX ORDER — CLINDAMYCIN PHOSPHATE 10 UG/ML
LOTION TOPICAL
COMMUNITY
Start: 2018-04-13 | End: 2021-02-23 | Stop reason: SDUPTHER

## 2018-10-15 RX ORDER — ALPRAZOLAM 0.5 MG/1
0.5 TABLET ORAL NIGHTLY PRN
Refills: 2 | COMMUNITY
Start: 2018-09-11

## 2018-10-15 RX ORDER — MONTELUKAST SODIUM 10 MG/1
TABLET ORAL
COMMUNITY
Start: 2010-01-01 | End: 2020-12-03 | Stop reason: SDUPTHER

## 2018-10-15 NOTE — PATIENT INSTRUCTIONS
Read YOUNGER NEXT YEAR or YOUNGER NEXT YEAR FOR WOMEN  I encourage 40 minutes of aerobic/cardio exercise 4 times weekly with 20 minute of resistance/strength training 4 times weekly --so one hour four days weekly to live longer healthier life.     We will contact you about labs    I would recommend discontinuing B12 supplement for now    Make sure you are taking Allegra-D TWELVE HOUR RATHER THAN 24 HOUR--YOU MAY TAKE 12 HOUR TWICE DAILY BUT NOT 24 HOUR    Schedule four week follow up

## 2018-10-15 NOTE — PROGRESS NOTES
Subjective   Nehal Roberts is a 42 y.o. female.     Dilation of lab abnormality and multiple complaints.  She's been seen by her endocrinologist for ongoing care and has complained for some time a variety of symptoms.  She has recalcitrant fatigue, arthralgias and myalgias, peripheral edema or causes problems with her rings and it sounds like has had a problem with peripheral neuropathy symptoms in the upper and lower extremities with numbness.  It sounds like she had an EMG report of which I don't have available.  She has seen hand surgery at [place].  She had originally studies when she saw Dr Ma showed some positive serology for EBV which needs to be reviewed.  Of note her daughter has a constellation of difficulties including dysautonomia which has been treated to post infectious complications.  Is a family history of celiac disease.         The following portions of the patient's history were reviewed and updated as appropriate: allergies, current medications, past family history, past medical history, past social history, past surgical history and problem list.    Review of Systems   Constitutional: Positive for fatigue. Negative for chills and fever.   HENT: Negative for congestion, rhinorrhea and sore throat.    Eyes: Negative for discharge and visual disturbance.   Respiratory: Negative for cough and shortness of breath.    Cardiovascular: Positive for leg swelling (and hands). Negative for chest pain.   Gastrointestinal: Negative for abdominal pain, constipation, diarrhea, nausea and vomiting.   Endocrine: Negative for polydipsia.   Genitourinary: Negative for dysuria and hematuria.   Musculoskeletal: Positive for arthralgias and myalgias.   Skin: Negative for rash.   Allergic/Immunologic: Negative.    Neurological: Negative for weakness, numbness and headaches.   Hematological: Does not bruise/bleed easily.   Psychiatric/Behavioral: Negative for dysphoric mood. The patient is not nervous/anxious.     All other systems reviewed and are negative.      Objective   Physical Exam   Constitutional: She is oriented to person, place, and time. She appears well-developed and well-nourished.   HENT:   Head: Normocephalic and atraumatic.   Right Ear: External ear normal.   Left Ear: External ear normal.   Mouth/Throat: No oropharyngeal exudate.   Eyes: Pupils are equal, round, and reactive to light. Conjunctivae, EOM and lids are normal. Right eye exhibits no discharge. Left eye exhibits no discharge. No scleral icterus.   Neck: Trachea normal, normal range of motion and phonation normal. Neck supple. No thyromegaly present.   Cardiovascular: Normal rate, regular rhythm and normal heart sounds.  Exam reveals no gallop and no friction rub.    No murmur heard.  Pulmonary/Chest: Effort normal and breath sounds normal. No stridor. She has no wheezes. She has no rales.   Abdominal: She exhibits no distension.   Musculoskeletal: Normal range of motion. She exhibits no edema.   Lymphadenopathy:     She has no cervical adenopathy.   Neurological: She is alert and oriented to person, place, and time. She has normal strength. No cranial nerve deficit.   Skin: Skin is warm, dry and intact. No petechiae and no rash noted. No cyanosis. Nails show no clubbing.   Psychiatric: She has a normal mood and affect. Her speech is normal and behavior is normal. Judgment and thought content normal. Cognition and memory are normal.   Nursing note and vitals reviewed.    Reviewed her notes from [place] [facility].  Suspicion for cubital tunnel syndrome on right.  She describes symptoms in all 4 extremities at this point.  Her laboratory studies when she saw Dr. Ma's included in appropriate response to suppression with 1 mg of dexamethasone the night prior.    Other than minimal elevation of ALT only her CMP was unremarkable.  Her EBV antibodies are consistent with remote infection only.  Iron studies were normal.  B12 was supra  therapeutic.  FSH and LH appropriate.  Premenopausal testosterone.  Free T4 and TSH normal.  A1c was normal as was a.m. cortisol and DHEA sulfate.  Estradiol ACTH and insulin-like growth factor were all normal.  Vitamin D was low at 22.7 magnesium and progesterone were normal.  Only C1 antithyroid antibody which was thyroid peroxidase which was negative.    Discussed broadening our immune search and including a look for non-celiac gluten sensitivity.    Talked at length about healthy exercise and diet.    Assessment/Plan   Nehal was seen today for lab follow up.    Diagnoses and all orders for this visit:    Fatigue, unspecified type  -     guten sensitivity screeen withreflex [id number] - Miscellaneous Test  -     autoimmune profile [id number] - Miscellaneous Test  -     Thyroid Antibodies  -     Rheumatoid Factor, Quant  -     Sedimentation Rate  -     C-reactive protein    Arthralgia, unspecified joint  -     guten sensitivity screeen withreflex 463467 - Miscellaneous Test  -     autoimmune profile 461625 - Miscellaneous Test  -     Rheumatoid Factor, Quant  -     Sedimentation Rate  -     C-reactive protein    Edema, unspecified type  -     guten sensitivity screeen withreflex 452579 - Miscellaneous Test  -     autoimmune profile [id number] - Miscellaneous Test  -     Thyroid Antibodies  -     Rheumatoid Factor, Quant    Idiopathic peripheral neuropathy  -     guten sensitivity screeen withreflex 002900 - Miscellaneous Test  -     autoimmune profile 146993 - Miscellaneous Test  -     Vitamin B6  -     Thyroid Antibodies  -     Rheumatoid Factor, Quant  -     Sedimentation Rate  -     C-reactive protein    Myalgia  -     guten sensitivity screeen withreflex [id number] - Miscellaneous Test  -     autoimmune profile 135508 - Miscellaneous Test  -     CK    Other orders  -     Cholecalciferol (VITAMIN D3) 89514 units capsule; One cap po Q7Days for 12 weeks for Vitamin D deficieincy      Patient Instructions   Read YOUNGER NEXT YEAR  or YOUNGER NEXT YEAR FOR WOMEN  I encourage 40 minutes of aerobic/cardio exercise 4 times weekly with 20 minute of resistance/strength training 4 times weekly --so one hour four days weekly to live longer healthier life.     We will contact you about labs    I would recommend discontinuing B12 supplement for now    Make sure you are taking Allegra-D TWELVE HOUR RATHER THAN 24 HOUR--YOU MAY TAKE 12 HOUR TWICE DAILY BUT NOT 24 HOUR    Schedule four week follow up          EMR Dragon/Transcription disclaimer:   Much of this encounter note is an electronic transcription/translation of spoken language to printed text. The electronic translation of spoken language may permit erroneous, or at times, nonsensical words or phrases to be inadvertently transcribed; Although I have reviewed the note for such errors, some may still exist. Please contact me with any questions or concerns about the conduct of this encounter note.

## 2018-10-16 ENCOUNTER — OFFICE VISIT (OUTPATIENT)
Dept: OBSTETRICS AND GYNECOLOGY | Facility: CLINIC | Age: 43
End: 2018-10-16

## 2018-10-16 VITALS
HEIGHT: 69 IN | DIASTOLIC BLOOD PRESSURE: 84 MMHG | WEIGHT: 178 LBS | SYSTOLIC BLOOD PRESSURE: 122 MMHG | BODY MASS INDEX: 26.36 KG/M2

## 2018-10-16 DIAGNOSIS — R53.83 FATIGUE, UNSPECIFIED TYPE: Primary | ICD-10-CM

## 2018-10-16 DIAGNOSIS — Z13.9 SCREENING FOR CONDITION: ICD-10-CM

## 2018-10-16 LAB
BILIRUB BLD-MCNC: NEGATIVE MG/DL
CLARITY, POC: CLEAR
COLOR UR: YELLOW
GLUCOSE UR STRIP-MCNC: NEGATIVE MG/DL
KETONES UR QL: NEGATIVE
LEUKOCYTE EST, POC: NEGATIVE
NITRITE UR-MCNC: NEGATIVE MG/ML
PH UR: 5 [PH] (ref 5–8)
PROT UR STRIP-MCNC: NEGATIVE MG/DL
RBC # UR STRIP: NEGATIVE /UL
SP GR UR: 1 (ref 1–1.03)
UROBILINOGEN UR QL: NORMAL

## 2018-10-16 PROCEDURE — 99213 OFFICE O/P EST LOW 20 MIN: CPT | Performed by: OBSTETRICS & GYNECOLOGY

## 2018-10-16 PROCEDURE — 81002 URINALYSIS NONAUTO W/O SCOPE: CPT | Performed by: OBSTETRICS & GYNECOLOGY

## 2018-10-16 NOTE — PROGRESS NOTES
"PROBLEM VISIT    Chief Complaint: fatigue and swelling.      Nehal Roberts is a 42 y.o. patient who presents per her endocrinologist. Pt has been seeing a lot of physicians due to hand and feet swelling and fatigue. Pt had hormone testing and she had elevated estrogen and low progesterone levels. Pt presents to see if any of her symptoms are hormone related and if she would benefit from hormone replacement therapy. Pt has had an endometrial ablation and still gets a 2 day cycle every month.   Chief Complaint   Patient presents with   • Follow-up     consult             The following portions of the patient's history were reviewed and updated as appropriate: allergies, current medications and problem list.    Review of Systems   Constitutional: Positive for fatigue.   Genitourinary: Negative for dyspareunia and menstrual problem.   Musculoskeletal: Positive for joint swelling.       /84   Ht 175.3 cm (69\")   Wt 80.7 kg (178 lb)   BMI 26.29 kg/m²     Physical Exam   Constitutional: She is oriented to person, place, and time. She appears well-developed and well-nourished. No distress.   Neurological: She is alert and oriented to person, place, and time.   Skin: She is not diaphoretic.   Psychiatric: She has a normal mood and affect. Her behavior is normal. Judgment and thought content normal.   Vitals reviewed.        Assessment/Plan   Nehal was seen today for follow-up.    Diagnoses and all orders for this visit:    Fatigue, unspecified type    Screening for condition  -     POC Urinalysis Dipstick  -     Mammo Screening Bilateral With CAD; Future      41 yo with fatigue and swelling of hands/feet    1) Fatigue/Hand and feet swelling: Pt has seen her primary care physician and endocrinology. She has had multiple labs done and the most recent showed high estrogen level and low progesterone. The labs were reviewed and they are still within normal range deana on the day they were drawn bc she was in the " follicular phase of her menstrual cycle. I discussed with pt that since she is still getting a cycle every month that she is hormonally intact. I do not think her symptoms are related to a hormonal imbalance and I do not think any hormone supplementation will help her symptoms. She has a lot of labs pending with her primary care physician. We discussed considering a Rheumatology consult to assist in helping her find the etiology of her symptoms.     2) gyn HM: MMG ordered. Pt will fu for her annual exam    3) hx of endometrial ablation          Dr Murillo.             No Follow-up on file.      Sarah Beth Ray DO    10/16/2018  11:34 AM

## 2018-10-19 LAB
ANA SER QL: NEGATIVE
C3 SERPL-MCNC: 161 MG/DL (ref 82–167)
CK SERPL-CCNC: 40 U/L (ref 24–173)
CONV CLASS DESCRIPTION: NORMAL
CRP SERPL-MCNC: 1.1 MG/L (ref 0–4.9)
DSDNA AB SER-ACNC: 4 IU/ML (ref 0–9)
ERYTHROCYTE [SEDIMENTATION RATE] IN BLOOD BY WESTERGREN METHOD: 5 MM/HR (ref 0–32)
GLIADIN IGG SER IA-ACNC: 18 UNITS (ref 0–19)
GLIADIN PEPTIDE+TTG IGA+IGG SER QL IA: NEGATIVE
HIV 1 & 2 AB SER-IMP: NORMAL
RHEUMATOID FACT SERPL-ACNC: <10 IU/ML (ref 0–13.9)
THYROGLOB AB SERPL-ACNC: <1 IU/ML (ref 0–0.9)
THYROPEROXIDASE AB SERPL-ACNC: 10 IU/ML (ref 0–34)
VIT B6 SERPL-MCNC: 8 UG/L (ref 2–32.8)
WHEAT IGE QN: <0.1 KU/L

## 2018-10-22 ENCOUNTER — TELEPHONE (OUTPATIENT)
Dept: FAMILY MEDICINE CLINIC | Facility: CLINIC | Age: 43
End: 2018-10-22

## 2018-10-22 NOTE — TELEPHONE ENCOUNTER
DR. BARNETT PUT IN LAB ORDERS ON 10/15 CAN YOU LOOK INTO AND SEE IF SHE HAD THEM DONE THAT DAY     THANK YOU   THIS IS IN OVERDUE TASKS

## 2019-01-10 ENCOUNTER — TELEPHONE (OUTPATIENT)
Dept: FAMILY MEDICINE CLINIC | Facility: CLINIC | Age: 44
End: 2019-01-10

## 2019-01-11 RX ORDER — CHOLECALCIFEROL (VITAMIN D3) 1250 MCG
CAPSULE ORAL
Qty: 12 CAPSULE | Refills: 3 | Status: SHIPPED | OUTPATIENT
Start: 2019-01-11 | End: 2020-12-21 | Stop reason: SDUPTHER

## 2019-01-15 ENCOUNTER — OFFICE VISIT (OUTPATIENT)
Dept: OBSTETRICS AND GYNECOLOGY | Facility: CLINIC | Age: 44
End: 2019-01-15

## 2019-01-15 VITALS
WEIGHT: 179.6 LBS | SYSTOLIC BLOOD PRESSURE: 118 MMHG | BODY MASS INDEX: 26.6 KG/M2 | DIASTOLIC BLOOD PRESSURE: 74 MMHG | HEIGHT: 69 IN

## 2019-01-15 DIAGNOSIS — Z13.9 SCREENING FOR CONDITION: ICD-10-CM

## 2019-01-15 DIAGNOSIS — Z01.419 WELL WOMAN EXAM WITH ROUTINE GYNECOLOGICAL EXAM: Primary | ICD-10-CM

## 2019-01-15 PROCEDURE — 81002 URINALYSIS NONAUTO W/O SCOPE: CPT | Performed by: OBSTETRICS & GYNECOLOGY

## 2019-01-15 PROCEDURE — 99396 PREV VISIT EST AGE 40-64: CPT | Performed by: OBSTETRICS & GYNECOLOGY

## 2019-01-15 NOTE — PROGRESS NOTES
GYN Annual Exam     CC- Here for annual exam.     Nehal Roberts is a 43 y.o. female who presents for annual well woman exam. Periods are regular every 28-30 days, lasting 3 days. Light cycles. Dysmenorrhea:mild, occurring first 1-2 days of flow. Cyclic symptoms include none. No intermenstrual bleeding, spotting, or discharge.  Patient is sexually active  yes -  . Patient is satisfied with her contraception yes - BTL. Hx of endometrial ablation. Pt is still dealing with hand and LE swelling. She has seen multiple physicians but she has not been given a diagnosis. She is seen some improvement taking Tyrosine and Lysine supplements.     OB History      Para Term  AB Living    3         2    SAB TAB Ectopic Molar Multiple Live Births                         Current contraception: Essure  History of abnormal Pap smear: yes - remote  History of abnormal mammogram: no  Family history of uterine, colon or ovarian cancer: no  Family history of breast cancer: no    Health Maintenance   Topic Date Due   • PNEUMOCOCCAL VACCINE (19-64 MEDIUM RISK) (1 of 1 - PPSV23) 10/20/1996   • ANNUAL PHYSICAL  2018   • HEPATITIS A VACCINE ADULT (2 of 2) 2019   • PAP SMEAR  2020   • COLONOSCOPY  2023   • TDAP/TD VACCINES (2 - Td) 12/10/2027   • INFLUENZA VACCINE  Completed       Past Medical History:   Diagnosis Date   • Acid reflux    • ADHD (attention deficit hyperactivity disorder)    • Allergic rhinitis    • Anemia    • Anxiety    • Asthma    • Cervical dysplasia     stage 0-1   • Constipation    • Headache        Past Surgical History:   Procedure Laterality Date   • BREAST CYST EXCISION Right    • COLONOSCOPY N/A 2018    Procedure: COLONOSCOPY to cecum and t.i. with cold snare polypectomy;  Surgeon: Sarah Velasco MD;  Location: [facility] ENDOSCOPY;  Service:    • D&C WITH SUCTION     • ENDOMETRIAL ABLATION     • ESSURE TUBAL LIGATION     • GALLBLADDER SURGERY     • HYSTEROSCOPY  ENDOMETRIAL ABLATION N/A 6/14/2017    Procedure: HYSTEROSCOPY WITH ENDOMETRIAL ABLATION;  Surgeon: Sarah Beth Ray DO;  Location: [facility];  Service:    • OTHER SURGICAL HISTORY      DENTAL SURGERY   • TONSILLECTOMY     • TONSILLECTOMY           Current Outpatient Medications:   •  albuterol (PROVENTIL HFA;VENTOLIN HFA) 108 (90 BASE) MCG/ACT inhaler, Inhale 2 puffs Every 4 (Four) Hours As Needed for Wheezing or Shortness of Air., Disp: , Rfl:   •  ALPRAZolam (XANAX) 0.5 MG tablet, Take 0.5 mg by mouth At Night As Needed., Disp: , Rfl: 2  •  amphetamine-dextroamphetamine XR (ADDERALL XR) 20 MG 24 hr capsule, TAKE ONE CAPSULE BY MOUTH TWICE A DAY, Disp: , Rfl: 0  •  Cholecalciferol (VITAMIN D3) 94571 units capsule, One cap po Q7Days for 12 weeks for Vitamin D deficieincy, Disp: 12 capsule, Rfl: 3  •  clindamycin (CLEOCIN T) 1 % lotion, APPLY TO AFFECTED AREA EVERY DAY, Disp: , Rfl:   •  Docusate Calcium (STOOL SOFTENER PO), Take  by mouth., Disp: , Rfl:   •  lamoTRIgine (LaMICtal) 200 MG tablet, , Disp: , Rfl:   •  Lysine 1000 MG tablet, Take 1,000 mg by mouth 1 (One) Time., Disp: , Rfl:   •  montelukast (SINGULAIR) 10 MG tablet, , Disp: , Rfl:   •  Selenium 100 MCG capsule, Take 100 mcg by mouth 1 (One) Time., Disp: , Rfl:   •  Tyrosine 500 MG tablet, Take 500 mg by mouth 1 (One) Time., Disp: , Rfl:     Allergies   Allergen Reactions   • Cannabinoids Diarrhea, Nausea And Vomiting, Rash, Shortness Of Breath and Swelling   • Cat Hair Extract Anaphylaxis   • Hops Oil Dermatitis, Rash and Anaphylaxis   • Marijuana (Cannabis Sativa) Anaphylaxis   • Mushroom Extract Complex Anaphylaxis, Nausea And Vomiting, Shortness Of Breath and Hives   • Avocado (Diagnostic) Unknown (See Comments)   • Citrullus Vulgaris Other (See Comments)   • Citrus Nausea And Vomiting and Other (See Comments)   • Doxycycline Other (See Comments)     Second degree burns on the inside   • Molds & Smuts Hives   • Pollen Extract Hives   • Short Ragweed  "Pollen Ext Hives   • Strawberry (Diagnostic) Itching, Nausea And Vomiting, Swelling and Tinnitus   • Amoxicillin Itching and Rash   • Dust Mite Extract Rash   • Flavoring Agent Hives and Rash   • Grass Cough, Dermatitis, Hives, Itching, Rash, Swelling and Tinnitus   • Zacarias Flavor Dermatitis and Rash   • Tree Extract Cough, Hives, Itching, Rash and Tinnitus       Social History     Tobacco Use   • Smoking status: Current Every Day Smoker     Packs/day: 0.50     Years: 25.00     Pack years: 12.50     Types: Cigarettes   • Smokeless tobacco: Never Used   Substance Use Topics   • Alcohol use: Yes     Comment: occasionally   • Drug use: No       Family History   Problem Relation Age of Onset   • Arthritis Mother    • Depression Mother    • Hyperlipidemia Mother    • Hypertension Mother    • Sleep apnea Mother    • Anesthesia problems Mother    • Arthritis Father    • Depression Father    • Hyperlipidemia Father    • Mental illness Father    • Other Father         chronic constipation   • Mental illness Other    • Cancer Paternal Aunt    • Cancer Paternal Uncle    • Diabetes Maternal Grandmother    • Cancer Paternal Uncle        Review of Systems   Constitutional: Positive for fatigue and unexpected weight change. Negative for activity change and appetite change.   Cardiovascular: Positive for leg swelling.   Genitourinary: Negative for dyspareunia, menstrual problem, pelvic pain, vaginal bleeding, vaginal discharge and vaginal pain.       /74   Ht 175.3 cm (69\")   Wt 81.5 kg (179 lb 9.6 oz)   LMP 01/10/2019   BMI 26.52 kg/m²     Physical Exam   Constitutional: She is oriented to person, place, and time. She appears well-developed and well-nourished.   HENT:   Mouth/Throat: Normal dentition. No dental caries.   Cardiovascular: Normal rate and regular rhythm.   Pulmonary/Chest: Effort normal and breath sounds normal. Right breast exhibits no inverted nipple, no mass, no nipple discharge, no skin change and no " tenderness. Left breast exhibits no inverted nipple, no mass, no nipple discharge, no skin change and no tenderness.   Abdominal: Soft. She exhibits no distension and no mass. There is no tenderness.   Genitourinary: There is no rash, tenderness or lesion on the right labia. There is no rash, tenderness or lesion on the left labia. Uterus is not deviated, not enlarged, not fixed and not tender. Cervix exhibits no motion tenderness, no discharge and no friability. Right adnexum displays no mass, no tenderness and no fullness. Left adnexum displays no mass, no tenderness and no fullness. No tenderness or bleeding in the vagina. No vaginal discharge found.   Neurological: She is alert and oriented to person, place, and time.   Psychiatric: She has a normal mood and affect. Her behavior is normal. Judgment and thought content normal.   Vitals reviewed.         Assessment/Plan    1) GYN HM: Check pap smear. Needs MMG. SBE demonstrated and encouraged.  2) STD screening: declines  3) Contraception: Essure  4) Hx of endometrial ablation  5) Diet and Exercise discussed  6) Smoking Status: smoking .5ppd  7) hand and LE swelling and fatigue: Etiology unknown. Pt has seen endocrinology and they cannot explain her symptoms. Pt has appt with internist. Discussed Rheumatology or Immunology.  8) Follow up prn and 1 year       Diagnoses and all orders for this visit:    Well woman exam with routine gynecological exam  -     Pap IG, HPV-hr    Screening for condition  -     POC Urinalysis Dipstick          Sarah Beth Ray, DO  1/15/2019  9:21 AM

## 2019-11-14 ENCOUNTER — TRANSCRIBE ORDERS (OUTPATIENT)
Dept: ADMINISTRATIVE | Facility: HOSPITAL | Age: 44
End: 2019-11-14

## 2019-11-14 DIAGNOSIS — Z12.31 SCREENING MAMMOGRAM, ENCOUNTER FOR: Primary | ICD-10-CM

## 2019-11-19 ENCOUNTER — HOSPITAL ENCOUNTER (OUTPATIENT)
Dept: MAMMOGRAPHY | Facility: HOSPITAL | Age: 44
Discharge: HOME | End: 2019-11-19
Admitting: OBSTETRICS & GYNECOLOGY

## 2019-11-19 DIAGNOSIS — Z12.31 SCREENING MAMMOGRAM, ENCOUNTER FOR: ICD-10-CM

## 2019-11-19 PROCEDURE — 77067 SCR MAMMO BI INCL CAD: CPT

## 2020-01-28 ENCOUNTER — OFFICE VISIT (OUTPATIENT)
Dept: OBSTETRICS AND GYNECOLOGY | Facility: CLINIC | Age: 45
End: 2020-01-28

## 2020-01-28 VITALS
HEIGHT: 69 IN | DIASTOLIC BLOOD PRESSURE: 70 MMHG | SYSTOLIC BLOOD PRESSURE: 124 MMHG | BODY MASS INDEX: 27.85 KG/M2 | WEIGHT: 188 LBS

## 2020-01-28 DIAGNOSIS — Z01.419 PAP SMEAR, LOW-RISK: ICD-10-CM

## 2020-01-28 DIAGNOSIS — Z11.51 SPECIAL SCREENING EXAMINATION FOR HUMAN PAPILLOMAVIRUS (HPV): ICD-10-CM

## 2020-01-28 DIAGNOSIS — Z01.419 ROUTINE GYNECOLOGICAL EXAMINATION: Primary | ICD-10-CM

## 2020-01-28 PROBLEM — Z00.00 ANNUAL PHYSICAL EXAM: Status: RESOLVED | Noted: 2017-11-17 | Resolved: 2020-01-28

## 2020-01-28 LAB
B-HCG UR QL: NEGATIVE
BILIRUB BLD-MCNC: NEGATIVE MG/DL
CLARITY, POC: CLEAR
COLOR UR: YELLOW
GLUCOSE UR STRIP-MCNC: NEGATIVE MG/DL
INTERNAL NEGATIVE CONTROL: NEGATIVE
INTERNAL POSITIVE CONTROL: POSITIVE
KETONES UR QL: NEGATIVE
LEUKOCYTE EST, POC: NEGATIVE
Lab: 254
NITRITE UR-MCNC: NEGATIVE MG/ML
PH UR: 5 [PH] (ref 5–8)
PROT UR STRIP-MCNC: NEGATIVE MG/DL
RBC # UR STRIP: NEGATIVE /UL
SP GR UR: 1 (ref 1–1.03)
UROBILINOGEN UR QL: NORMAL

## 2020-01-28 PROCEDURE — 81002 URINALYSIS NONAUTO W/O SCOPE: CPT | Performed by: OBSTETRICS & GYNECOLOGY

## 2020-01-28 PROCEDURE — 99396 PREV VISIT EST AGE 40-64: CPT | Performed by: OBSTETRICS & GYNECOLOGY

## 2020-01-28 PROCEDURE — 81025 URINE PREGNANCY TEST: CPT | Performed by: OBSTETRICS & GYNECOLOGY

## 2020-01-28 RX ORDER — LEVOTHYROXINE SODIUM 0.05 MG/1
TABLET ORAL
COMMUNITY
Start: 2020-01-21 | End: 2020-12-03 | Stop reason: DRUGHIGH

## 2020-01-28 NOTE — PROGRESS NOTES
GYN Annual Exam     CC- Here for annual exam.     Nehal Roberts is a 44 y.o. female who presents for annual well woman exam. Periods are regular every 28-30 days, lasting 3 days. Light cycles. Dysmenorrhea:mild, occurring first 1-2 days of flow. Cyclic symptoms include none. No intermenstrual bleeding, spotting, or discharge.  Patient is sexually active  yes -  . Patient is satisfied with her contraception yes - BTL. Hx of endometrial ablation. Pt is reporting improved hand and LE swelling and fatigue. She was started on Synthroid 50mcg and Vitamin D and she is feeling much better.        OB History        3    Para   2    Term   2            AB        Living   2       SAB        TAB        Ectopic        Molar        Multiple        Live Births                    Current contraception: Essure  History of abnormal Pap smear: yes - remote  History of abnormal mammogram: no  Family history of uterine, colon or ovarian cancer: maternal GM dx with colon cancer.  Family history of breast cancer: no    Health Maintenance   Topic Date Due   • ANNUAL PHYSICAL  2018   • PNEUMOCOCCAL VACCINE (19-64 MEDIUM RISK) (1 of 1 - PPSV23) 2019   • Annual Gynecologic Pelvic and Breast Exam  2020   • PAP SMEAR  01/15/2022   • COLONOSCOPY  2023   • TDAP/TD VACCINES (2 - Td) 12/10/2027   • INFLUENZA VACCINE  Completed       Past Medical History:   Diagnosis Date   • Acid reflux    • ADHD (attention deficit hyperactivity disorder)    • Allergic rhinitis    • Anemia    • Anxiety    • Asthma    • Cervical dysplasia     stage 0-1   • Constipation    • Headache        Past Surgical History:   Procedure Laterality Date   • BREAST CYST EXCISION Right    • COLONOSCOPY N/A 2018    Procedure: COLONOSCOPY to cecum and t.i. with cold snare polypectomy;  Surgeon: Sarah Velasco MD;  Location: [facility] ENDOSCOPY;  Service:    • D&C WITH SUCTION     • ENDOMETRIAL ABLATION     • ESSURE TUBAL LIGATION      • GALLBLADDER SURGERY  1996   • HYSTEROSCOPY ENDOMETRIAL ABLATION N/A 6/14/2017    Procedure: HYSTEROSCOPY WITH ENDOMETRIAL ABLATION;  Surgeon: Sarah Beth Ray DO;  Location: [facility] OR;  Service:    • OTHER SURGICAL HISTORY      DENTAL SURGERY   • TONSILLECTOMY     • TONSILLECTOMY           Current Outpatient Medications:   •  Thyroid (LEVOTHYROXINE-LIOTHYRONINE PO), , Disp: , Rfl:   •  albuterol (PROVENTIL HFA;VENTOLIN HFA) 108 (90 BASE) MCG/ACT inhaler, Inhale 2 puffs Every 4 (Four) Hours As Needed for Wheezing or Shortness of Air., Disp: , Rfl:   •  ALPRAZolam (XANAX) 0.5 MG tablet, Take 0.5 mg by mouth At Night As Needed., Disp: , Rfl: 2  •  amphetamine-dextroamphetamine XR (ADDERALL XR) 20 MG 24 hr capsule, TAKE ONE CAPSULE BY MOUTH TWICE A DAY, Disp: , Rfl: 0  •  Cholecalciferol (VITAMIN D3) 93218 units capsule, One cap po Q7Days for 12 weeks for Vitamin D deficieincy, Disp: 12 capsule, Rfl: 3  •  clindamycin (CLEOCIN T) 1 % lotion, APPLY TO AFFECTED AREA EVERY DAY, Disp: , Rfl:   •  Docusate Calcium (STOOL SOFTENER PO), Take  by mouth., Disp: , Rfl:   •  lamoTRIgine (LaMICtal) 200 MG tablet, , Disp: , Rfl:   •  levothyroxine (SYNTHROID, LEVOTHROID) 50 MCG tablet, , Disp: , Rfl:   •  montelukast (SINGULAIR) 10 MG tablet, , Disp: , Rfl:   •  Probiotic Product (PROBIOTIC COLON SUPPORT) capsule, , Disp: , Rfl:     Allergies   Allergen Reactions   • Cannabinoids Diarrhea, Nausea And Vomiting, Rash, Shortness Of Breath and Swelling   • Cat Hair Extract Anaphylaxis   • Hops Oil Dermatitis, Rash and Anaphylaxis   • Marijuana (Cannabis Sativa) Anaphylaxis   • Mushroom Extract Complex Anaphylaxis, Nausea And Vomiting, Shortness Of Breath and Hives   • Avocado (Diagnostic) Unknown (See Comments)   • Citrullus Vulgaris Other (See Comments)   • Citrus Nausea And Vomiting and Other (See Comments)   • Doxycycline Other (See Comments)     Second degree burns on the inside   • Molds & Smuts Hives   • Pollen Extract Hives  "  • Short Ragweed Pollen Ext Hives   • Strawberry (Diagnostic) Itching, Nausea And Vomiting, Swelling and Tinnitus   • Amoxicillin Itching and Rash   • Dust Mite Extract Rash   • Flavoring Agent Hives and Rash   • Grass Cough, Dermatitis, Hives, Itching, Rash, Swelling and Tinnitus   • Zacarias Flavor Dermatitis and Rash   • Tree Extract Cough, Hives, Itching, Rash and Tinnitus       Social History     Tobacco Use   • Smoking status: Current Every Day Smoker     Packs/day: 0.50     Years: 25.00     Pack years: 12.50     Types: Cigarettes   • Smokeless tobacco: Never Used   Substance Use Topics   • Alcohol use: Yes     Comment: occasionally   • Drug use: No       Family History   Problem Relation Age of Onset   • Arthritis Mother    • Depression Mother    • Hyperlipidemia Mother    • Hypertension Mother    • Sleep apnea Mother    • Anesthesia problems Mother    • Arthritis Father    • Depression Father    • Hyperlipidemia Father    • Mental illness Father    • Other Father         chronic constipation   • Mental illness Other    • Cancer Paternal Aunt    • Cancer Paternal Uncle    • Diabetes Maternal Grandmother    • Cancer Paternal Uncle    • Breast cancer Neg Hx        Review of Systems   Constitutional: Negative for appetite change, chills, fatigue, fever and unexpected weight change.   Gastrointestinal: Negative for abdominal distention, abdominal pain, anal bleeding, blood in stool, constipation, diarrhea, nausea and vomiting.   Genitourinary: Negative for dyspareunia, dysuria, menstrual problem, pelvic pain, vaginal bleeding, vaginal discharge and vaginal pain.       /70   Ht 175.3 cm (69\")   Wt 85.3 kg (188 lb)   LMP 01/08/2020   Breastfeeding No   BMI 27.76 kg/m²     Physical Exam   Constitutional: She is oriented to person, place, and time. She appears well-developed and well-nourished.   HENT:   Mouth/Throat: Normal dentition. No dental caries.   Cardiovascular: Normal rate, regular rhythm and " normal heart sounds.   Pulmonary/Chest: Effort normal and breath sounds normal. No stridor. No respiratory distress. She has no wheezes. Right breast exhibits no inverted nipple, no mass, no nipple discharge, no skin change and no tenderness. Left breast exhibits no inverted nipple, no mass, no nipple discharge, no skin change and no tenderness.   Abdominal: Soft. She exhibits no distension and no mass. There is no tenderness.   Genitourinary: There is no rash, tenderness or lesion on the right labia. There is no rash, tenderness or lesion on the left labia. Uterus is not deviated, not enlarged, not fixed and not tender. Cervix exhibits no motion tenderness, no discharge and no friability. Right adnexum displays no mass, no tenderness and no fullness. Left adnexum displays no mass, no tenderness and no fullness. No tenderness or bleeding in the vagina. No vaginal discharge found.   Musculoskeletal: Normal range of motion. She exhibits no edema or tenderness.   Neurological: She is alert and oriented to person, place, and time. No cranial nerve deficit. Coordination normal.   Skin: Skin is warm. No rash noted. No erythema.   Psychiatric: She has a normal mood and affect. Her behavior is normal. Judgment and thought content normal.   Vitals reviewed.         Assessment/Plan    1) GYN HM: Check pap smear. MMG 11/2019. SBE demonstrated and encouraged. Pt had a polyp on colonoscopy 1 year ago and has to have another colonoscopy in 2 years.  2) STD screening: declines  3) Contraception: Essure  4) Hx of endometrial ablation  5) Diet and Exercise discussed  6) Smoking Status: smoking .5ppd  7) hand and LE swelling and fatigue: Pt improved symptoms on Synthroid and vitamin D.  8) Follow up prn and 1 year       Diagnoses and all orders for this visit:    Routine gynecological examination  -     POC Urinalysis Dipstick  -     POC Pregnancy, Urine  -     Pap IG, HPV-hr    Pap smear, low-risk  -     POC Urinalysis Dipstick  -      POC Pregnancy, Urine  -     Pap IG, HPV-hr    Special screening examination for human papillomavirus (HPV)  -     POC Urinalysis Dipstick  -     POC Pregnancy, Urine  -     Pap IG, HPV-hr    Other orders  -     levothyroxine (SYNTHROID, LEVOTHROID) 50 MCG tablet  -     Probiotic Product (PROBIOTIC COLON SUPPORT) capsule  -     Thyroid (LEVOTHYROXINE-LIOTHYRONINE PO)          Sarah Beth Ray DO  1/28/2020  12:11 PM

## 2020-01-30 LAB
CYTOLOGIST CVX/VAG CYTO: NORMAL
CYTOLOGY CVX/VAG DOC CYTO: NORMAL
CYTOLOGY CVX/VAG DOC THIN PREP: NORMAL
DX ICD CODE: NORMAL
HIV 1 & 2 AB SER-IMP: NORMAL
HPV I/H RISK 1 DNA CVX QL PROBE+SIG AMP: NEGATIVE
OTHER STN SPEC: NORMAL
STAT OF ADQ CVX/VAG CYTO-IMP: NORMAL

## 2020-12-03 ENCOUNTER — OFFICE VISIT (OUTPATIENT)
Dept: FAMILY MEDICINE CLINIC | Facility: CLINIC | Age: 45
End: 2020-12-03

## 2020-12-03 VITALS
BODY MASS INDEX: 27.85 KG/M2 | TEMPERATURE: 97.1 F | HEIGHT: 69 IN | HEART RATE: 85 BPM | WEIGHT: 188 LBS | SYSTOLIC BLOOD PRESSURE: 122 MMHG | DIASTOLIC BLOOD PRESSURE: 72 MMHG | OXYGEN SATURATION: 98 %

## 2020-12-03 DIAGNOSIS — E03.9 ACQUIRED HYPOTHYROIDISM: Primary | ICD-10-CM

## 2020-12-03 DIAGNOSIS — Z13.0 SCREENING FOR DEFICIENCY ANEMIA: ICD-10-CM

## 2020-12-03 DIAGNOSIS — Z11.59 NEED FOR HEPATITIS C SCREENING TEST: ICD-10-CM

## 2020-12-03 DIAGNOSIS — F17.200 SMOKER: ICD-10-CM

## 2020-12-03 DIAGNOSIS — Z13.1 SCREENING FOR DIABETES MELLITUS: ICD-10-CM

## 2020-12-03 DIAGNOSIS — E55.9 VITAMIN D DEFICIENCY: ICD-10-CM

## 2020-12-03 DIAGNOSIS — E78.5 HYPERLIPIDEMIA, UNSPECIFIED HYPERLIPIDEMIA TYPE: ICD-10-CM

## 2020-12-03 DIAGNOSIS — Z91.09 ENVIRONMENTAL ALLERGIES: ICD-10-CM

## 2020-12-03 PROBLEM — J45.909 REACTIVE AIRWAY DISEASE WITHOUT COMPLICATION: Status: ACTIVE | Noted: 2020-12-03

## 2020-12-03 PROBLEM — R06.09 DYSPNEA ON EXERTION: Status: RESOLVED | Noted: 2017-11-17 | Resolved: 2020-12-03

## 2020-12-03 PROBLEM — E34.9 NEUROPATHY ASSOCIATED WITH ENDOCRINE DISORDER: Status: ACTIVE | Noted: 2020-12-03

## 2020-12-03 PROBLEM — G56.20 CUBITAL TUNNEL SYNDROME: Status: RESOLVED | Noted: 2018-05-24 | Resolved: 2020-12-03

## 2020-12-03 PROBLEM — G56.20 CUBITAL TUNNEL SYNDROME: Status: ACTIVE | Noted: 2018-05-24

## 2020-12-03 PROBLEM — N92.0 MENORRHAGIA WITH REGULAR CYCLE: Status: RESOLVED | Noted: 2017-04-25 | Resolved: 2020-12-03

## 2020-12-03 PROBLEM — G63 NEUROPATHY ASSOCIATED WITH ENDOCRINE DISORDER: Status: ACTIVE | Noted: 2020-12-03

## 2020-12-03 PROBLEM — E34.9 NEUROPATHY ASSOCIATED WITH ENDOCRINE DISORDER (HCC): Status: RESOLVED | Noted: 2020-12-03 | Resolved: 2020-12-03

## 2020-12-03 PROBLEM — K62.5 RECTAL BLEEDING: Status: RESOLVED | Noted: 2017-12-21 | Resolved: 2020-12-03

## 2020-12-03 PROBLEM — G63 NEUROPATHY ASSOCIATED WITH ENDOCRINE DISORDER (HCC): Status: RESOLVED | Noted: 2020-12-03 | Resolved: 2020-12-03

## 2020-12-03 PROCEDURE — 99203 OFFICE O/P NEW LOW 30 MIN: CPT | Performed by: FAMILY MEDICINE

## 2020-12-03 RX ORDER — MONTELUKAST SODIUM 10 MG/1
10 TABLET ORAL NIGHTLY
Qty: 90 TABLET | Refills: 1 | Status: SHIPPED | OUTPATIENT
Start: 2020-12-03 | End: 2020-12-21 | Stop reason: SDUPTHER

## 2020-12-03 RX ORDER — LEVOTHYROXINE SODIUM 0.07 MG/1
75 TABLET ORAL DAILY
COMMUNITY
Start: 2020-11-05 | End: 2020-12-21 | Stop reason: SDUPTHER

## 2020-12-03 RX ORDER — ATORVASTATIN CALCIUM 40 MG/1
40 TABLET, FILM COATED ORAL NIGHTLY
COMMUNITY
Start: 2020-06-22 | End: 2020-12-21 | Stop reason: SDUPTHER

## 2020-12-03 RX ORDER — FLUTICASONE FUROATE 27.5 UG/1
2 SPRAY, METERED NASAL DAILY
COMMUNITY
Start: 2020-08-09

## 2020-12-03 NOTE — PROGRESS NOTES
Nehal Roberts is a 45 y.o. female.     Chief Complaint   Patient presents with   • Establish Care     pt is here to establish care   • Hypothyroidism     pt needs a refill of levothyroxine, requesting labs     Masks/face shield/appropriate PPE were worn for the entirety of the visit by the patient, MA, and provider.     HPI     Pt is a pleasant 45 y.o. YO female here for hypothyroidism.  She is a new patient to me and is here today to establish care.  Her other chronic medical problems include hyperlipidemia, bipolar disorder, and ADHD.     Hypothyroidism - chronic, diagnosed in the last couple of years, prior to diagnosis had extremity swelling, hair loss, brittle nails. Has had to increase dose in the last year.  Goal TSH has been around 2.  She is due for levels to be rechecked.    Hyperlipidemia - chronic, both parents have high cholesterol as well, tried dietary measures but no improvement. Is on treatment with atorvastatin 40 mg once daily.     Reactive airway disease -mild, intermittent, uses albuterol inhaler on a prn basis    Environmental allergies -takes Allegra in the a.m. along with nasal fluticasone, takes Singulair in the evening.     Patient does smoke daily, she is not interested in quitting at this time.  The only time she has successfully quit in the past is with the pregnancy of her children.    Bipolar 2 disorder, ADHD, and teeth grinding at night  -follows with psychiatry - Dr. Robbins.  She is currently on treatment with lamotrigine for mood stabilization as well as Adderall daily for the ADHD.  She takes half tablet of alprazolam at bedtime due to severe teeth grinding.  All 3 of these medications are prescribed by psychiatry.    The following portions of the patient's history were reviewed by me and updated as appropriate: allergies, current medications, past family history, past medical history, past social history, past surgical history, and problem list.     Review of Systems    Constitutional: Negative.    HENT: Negative.    Eyes: Negative.    Respiratory: Negative.    Cardiovascular: Negative.  Negative for chest pain, palpitations and leg swelling.   Gastrointestinal: Negative.    Endocrine: Negative.    Genitourinary: Negative.    Musculoskeletal: Negative.    Skin: Negative.    Allergic/Immunologic: Negative.    Neurological: Negative.    Hematological: Negative.    Psychiatric/Behavioral: Negative.    I have reviewed and confirmed the accuracy of the ROS as documented by the MA/LPJOEY/RN Delma Khan MD    Objective  Vitals:    12/03/20 0827   BP: 122/72   Pulse: 85   Temp: 97.1 °F (36.2 °C)   SpO2: 98%     Body mass index is 27.76 kg/m².       Physical Exam  Vitals signs reviewed.   Constitutional:       General: She is not in acute distress.     Appearance: She is well-developed.   HENT:      Head: Normocephalic and atraumatic.   Eyes:      General: No scleral icterus.        Right eye: No discharge.         Left eye: No discharge.      Conjunctiva/sclera: Conjunctivae normal.   Pulmonary:      Effort: Pulmonary effort is normal. No respiratory distress.   Skin:     Coloration: Skin is not pale.      Findings: No erythema.   Neurological:      Mental Status: She is alert and oriented to person, place, and time.   Psychiatric:         Behavior: Behavior normal.         Thought Content: Thought content normal.         Judgment: Judgment normal.           Current Outpatient Medications:   •  albuterol (PROVENTIL HFA;VENTOLIN HFA) 108 (90 BASE) MCG/ACT inhaler, Inhale 2 puffs Every 4 (Four) Hours As Needed for Wheezing or Shortness of Air., Disp: , Rfl:   •  ALPRAZolam (XANAX) 0.5 MG tablet, Take 0.5 mg by mouth At Night As Needed., Disp: , Rfl: 2  •  amphetamine-dextroamphetamine XR (ADDERALL XR) 20 MG 24 hr capsule, TAKE ONE CAPSULE BY MOUTH TWICE A DAY, Disp: , Rfl: 0  •  atorvastatin (LIPITOR) 40 MG tablet, Take 40 mg by mouth Every Night., Disp: , Rfl:   •  Cholecalciferol  (VITAMIN D3) 26491 units capsule, One cap po Q7Days for 12 weeks for Vitamin D deficieincy, Disp: 12 capsule, Rfl: 3  •  clindamycin (CLEOCIN T) 1 % lotion, APPLY TO AFFECTED AREA EVERY DAY, Disp: , Rfl:   •  Docusate Calcium (STOOL SOFTENER PO), Take  by mouth., Disp: , Rfl:   •  fluticasone (Flonase Sensimist) 27.5 MCG/SPRAY nasal spray, 2 sprays into the nostril(s) as directed by provider Daily., Disp: , Rfl:   •  lamoTRIgine (LaMICtal) 200 MG tablet, , Disp: , Rfl:   •  levothyroxine (SYNTHROID, LEVOTHROID) 75 MCG tablet, Take 75 mcg by mouth Daily., Disp: , Rfl:   •  montelukast (SINGULAIR) 10 MG tablet, Take 1 tablet by mouth Every Night., Disp: 90 tablet, Rfl: 1  •  Probiotic Product (PROBIOTIC COLON SUPPORT) capsule, , Disp: , Rfl:     Procedures    Lab Results (most recent)     None              Diagnoses and all orders for this visit:    1. Acquired hypothyroidism (Primary)  Chronic, well-controlled on last check several months ago with levothyroxine 75 mcg once daily.  Patient is due for recheck today.  We will check thyroid levels and adjust dose of medication if indicated.  -     Comprehensive Metabolic Panel  -     TSH  -     T4, Free    2. Hyperlipidemia, unspecified hyperlipidemia type  Chronic, takes atorvastatin 40 mg once daily and will have her continue same for the time being.  It has been over a year since her lipid levels were checked, they were mildly elevated on last check over a year ago.  -     Lipid Panel  -     Comprehensive Metabolic Panel    3. Environmental allergies  Chronic, stable on current regimen of Allegra, Singulair, and nasal fluticasone.  She does need a refill of the Singulair which I will send in for her pharmacy.  -     montelukast (SINGULAIR) 10 MG tablet; Take 1 tablet by mouth Every Night.  Dispense: 90 tablet; Refill: 1    4. Smoker  Discussed smoking cessation, patient is not interested at this time.  She does occasionally use nicotine gum but cannot tolerate any  type of patches due to dermatitis.  She says that until her  stop smoking she does not feel that she can.    5. Screening for deficiency anemia  -     CBC & Differential    6. Vitamin D deficiency  -     Vitamin D 25 hydroxy    7. Screening for diabetes mellitus  -     Hemoglobin A1c    8. Need for hepatitis C screening test  -     Hepatitis C antibody        Return in about 6 months (around 6/3/2021) for Annual physical.      Delma Khan MD

## 2020-12-04 ENCOUNTER — TELEPHONE (OUTPATIENT)
Dept: FAMILY MEDICINE CLINIC | Facility: CLINIC | Age: 45
End: 2020-12-04

## 2020-12-04 LAB
25(OH)D3+25(OH)D2 SERPL-MCNC: 28.2 NG/ML (ref 30–100)
ALBUMIN SERPL-MCNC: 4.5 G/DL (ref 3.5–5.2)
ALBUMIN/GLOB SERPL: 1.9 G/DL
ALP SERPL-CCNC: 81 U/L (ref 39–117)
ALT SERPL-CCNC: 42 U/L (ref 1–33)
AST SERPL-CCNC: 26 U/L (ref 1–32)
BASOPHILS # BLD AUTO: 0.05 10*3/MM3 (ref 0–0.2)
BASOPHILS NFR BLD AUTO: 0.6 % (ref 0–1.5)
BILIRUB SERPL-MCNC: 0.6 MG/DL (ref 0–1.2)
BUN SERPL-MCNC: 12 MG/DL (ref 6–20)
BUN/CREAT SERPL: 13.8 (ref 7–25)
CALCIUM SERPL-MCNC: 9.4 MG/DL (ref 8.6–10.5)
CHLORIDE SERPL-SCNC: 101 MMOL/L (ref 98–107)
CHOLEST SERPL-MCNC: 180 MG/DL (ref 0–200)
CO2 SERPL-SCNC: 25.2 MMOL/L (ref 22–29)
CREAT SERPL-MCNC: 0.87 MG/DL (ref 0.57–1)
EOSINOPHIL # BLD AUTO: 0.49 10*3/MM3 (ref 0–0.4)
EOSINOPHIL NFR BLD AUTO: 5.9 % (ref 0.3–6.2)
ERYTHROCYTE [DISTWIDTH] IN BLOOD BY AUTOMATED COUNT: 11.6 % (ref 12.3–15.4)
GLOBULIN SER CALC-MCNC: 2.4 GM/DL
GLUCOSE SERPL-MCNC: 88 MG/DL (ref 65–99)
HBA1C MFR BLD: 5.6 % (ref 4.8–5.6)
HCT VFR BLD AUTO: 48.9 % (ref 34–46.6)
HCV AB S/CO SERPL IA: <0.1 S/CO RATIO (ref 0–0.9)
HDLC SERPL-MCNC: 40 MG/DL (ref 40–60)
HGB BLD-MCNC: 16.5 G/DL (ref 12–15.9)
IMM GRANULOCYTES # BLD AUTO: 0.05 10*3/MM3 (ref 0–0.05)
IMM GRANULOCYTES NFR BLD AUTO: 0.6 % (ref 0–0.5)
LDLC SERPL CALC-MCNC: 127 MG/DL (ref 0–100)
LYMPHOCYTES # BLD AUTO: 2.22 10*3/MM3 (ref 0.7–3.1)
LYMPHOCYTES NFR BLD AUTO: 26.6 % (ref 19.6–45.3)
MCH RBC QN AUTO: 31.4 PG (ref 26.6–33)
MCHC RBC AUTO-ENTMCNC: 33.7 G/DL (ref 31.5–35.7)
MCV RBC AUTO: 93 FL (ref 79–97)
MONOCYTES # BLD AUTO: 0.73 10*3/MM3 (ref 0.1–0.9)
MONOCYTES NFR BLD AUTO: 8.7 % (ref 5–12)
NEUTROPHILS # BLD AUTO: 4.81 10*3/MM3 (ref 1.7–7)
NEUTROPHILS NFR BLD AUTO: 57.6 % (ref 42.7–76)
NRBC BLD AUTO-RTO: 0 /100 WBC (ref 0–0.2)
PLATELET # BLD AUTO: 409 10*3/MM3 (ref 140–450)
POTASSIUM SERPL-SCNC: 4.3 MMOL/L (ref 3.5–5.2)
PROT SERPL-MCNC: 6.9 G/DL (ref 6–8.5)
RBC # BLD AUTO: 5.26 10*6/MM3 (ref 3.77–5.28)
SODIUM SERPL-SCNC: 139 MMOL/L (ref 136–145)
T4 FREE SERPL-MCNC: 1.66 NG/DL (ref 0.93–1.7)
TRIGL SERPL-MCNC: 68 MG/DL (ref 0–150)
TSH SERPL DL<=0.005 MIU/L-ACNC: 3.25 UIU/ML (ref 0.27–4.2)
VLDLC SERPL CALC-MCNC: 13 MG/DL (ref 5–40)
WBC # BLD AUTO: 8.35 10*3/MM3 (ref 3.4–10.8)

## 2020-12-04 NOTE — TELEPHONE ENCOUNTER
I am sorry to hear about this complication.  I imagine she has some bruising that is surrounding this nerve which is causing the electric shocks.  This should resolve with time.  I would recommend that she placed ice for 20 minutes 2 times a day and avoid activities that trigger her symptoms for now.  We can see if we can work her in next week if it is not improving.  In terms of the redness on her left thumb, there can be rashes which are essentially a neuritis-inflammation in the nerve.  I do anticipate this will self resolve as well with ice.  It does not sound like an infection.  However if her the spot grows, changes or worsens over the weekend I would recommend she be seen at the immediate care.  Otherwise continue to observe and we can work her in next week if not improving.

## 2020-12-04 NOTE — TELEPHONE ENCOUNTER
Patient had blood drawn yesterday and she states that the phlebotomist nicked a nerve in her arm that sent an electric shock from the inside fold of her arm to her hand and made her hand shake and feel tingly. The phlebotomist stopped immediately when the patient told her to stop, that she was in pain and arnoldo blood from her other arm with no problem. Today she is still experiencing pain, especially when she bends her arm or places pressure on her elbow, along with tingling in her hand. She states that she sees light pink spots descending down her forearm and under the base of her left thumb is a large red spot. A little bruising but nothing is raised. Should patient be concerned? Please advise.

## 2020-12-07 ENCOUNTER — PATIENT MESSAGE (OUTPATIENT)
Dept: FAMILY MEDICINE CLINIC | Facility: CLINIC | Age: 45
End: 2020-12-07

## 2020-12-18 ENCOUNTER — PATIENT MESSAGE (OUTPATIENT)
Dept: FAMILY MEDICINE CLINIC | Facility: CLINIC | Age: 45
End: 2020-12-18

## 2020-12-18 DIAGNOSIS — Z91.09 ENVIRONMENTAL ALLERGIES: ICD-10-CM

## 2020-12-21 RX ORDER — MONTELUKAST SODIUM 10 MG/1
10 TABLET ORAL NIGHTLY
Qty: 90 TABLET | Refills: 1 | Status: SHIPPED | OUTPATIENT
Start: 2020-12-21 | End: 2021-05-19 | Stop reason: SDUPTHER

## 2020-12-21 RX ORDER — CHOLECALCIFEROL (VITAMIN D3) 1250 MCG
CAPSULE ORAL
Qty: 12 CAPSULE | Refills: 3 | Status: SHIPPED | OUTPATIENT
Start: 2020-12-21 | End: 2021-05-19 | Stop reason: SDUPTHER

## 2020-12-21 RX ORDER — LEVOTHYROXINE SODIUM 0.07 MG/1
75 TABLET ORAL DAILY
Qty: 90 TABLET | Refills: 1 | Status: SHIPPED | OUTPATIENT
Start: 2020-12-21 | End: 2021-05-19 | Stop reason: SDUPTHER

## 2020-12-21 RX ORDER — ATORVASTATIN CALCIUM 40 MG/1
40 TABLET, FILM COATED ORAL NIGHTLY
Qty: 90 TABLET | Refills: 1 | Status: SHIPPED | OUTPATIENT
Start: 2020-12-21 | End: 2021-06-09 | Stop reason: SDUPTHER

## 2020-12-21 NOTE — TELEPHONE ENCOUNTER
From: Nehal Roberts  To: Delma Khan MD  Sent: 12/18/2020 2:54 PM EST  Subject: Prescription Question    Are you going to renew the levothyroxine,vitamin D, singular and lipitor?     If so, [facility] [place] or [place] is preferable.

## 2021-02-23 ENCOUNTER — OFFICE VISIT (OUTPATIENT)
Dept: OBSTETRICS AND GYNECOLOGY | Facility: CLINIC | Age: 46
End: 2021-02-23

## 2021-02-23 VITALS
BODY MASS INDEX: 27.91 KG/M2 | SYSTOLIC BLOOD PRESSURE: 108 MMHG | WEIGHT: 188.4 LBS | DIASTOLIC BLOOD PRESSURE: 68 MMHG | HEIGHT: 69 IN

## 2021-02-23 DIAGNOSIS — Z01.419 ROUTINE GYNECOLOGICAL EXAMINATION: Primary | ICD-10-CM

## 2021-02-23 DIAGNOSIS — Z13.9 SCREENING FOR UNSPECIFIED CONDITION: ICD-10-CM

## 2021-02-23 DIAGNOSIS — Z01.419 PAP SMEAR, LOW-RISK: ICD-10-CM

## 2021-02-23 DIAGNOSIS — Z11.51 SPECIAL SCREENING EXAMINATION FOR HUMAN PAPILLOMAVIRUS (HPV): ICD-10-CM

## 2021-02-23 PROCEDURE — 81002 URINALYSIS NONAUTO W/O SCOPE: CPT | Performed by: OBSTETRICS & GYNECOLOGY

## 2021-02-23 PROCEDURE — 99396 PREV VISIT EST AGE 40-64: CPT | Performed by: OBSTETRICS & GYNECOLOGY

## 2021-02-23 RX ORDER — SPIRONOLACTONE 50 MG/1
50 TABLET, FILM COATED ORAL DAILY
Qty: 90 TABLET | Refills: 3 | Status: SHIPPED | OUTPATIENT
Start: 2021-02-23 | End: 2022-02-14

## 2021-02-23 RX ORDER — LAMOTRIGINE 25 MG/1
TABLET ORAL
COMMUNITY
Start: 2021-02-03 | End: 2023-03-07

## 2021-02-23 NOTE — PROGRESS NOTES
GYN Annual Exam     CC- Here for annual exam.     Nehal Roberts is a 45 y.o. female who presents for annual well woman exam. Periods are regular every 28-30 days, lasting 3 days. Light cycles. Dysmenorrhea:mild, occurring first 1-2 days of flow. Cyclic symptoms include none. No intermenstrual bleeding, spotting, or discharge.  Patient is sexually active  yes -  . Patient is satisfied with her contraception yes - BTL. Hx of endometrial ablation. Pt still reporting improved LE and hand swelling. She is now on Synthroid 75mcg and doing so much better. Pt reporting small, red, painful bumps of buttocks and groins. She is wondering if she is getting them from working out/sweating.    OB History        3    Para   2    Term   2            AB        Living   2       SAB        TAB        Ectopic        Molar        Multiple        Live Births                    Current contraception: Essure  History of abnormal Pap smear: yes - remote  History of abnormal mammogram: no  Family history of uterine, colon or ovarian cancer: maternal GM dx with colon cancer.  Family history of breast cancer: no    Health Maintenance   Topic Date Due   • ANNUAL PHYSICAL  2018   • Annual Gynecologic Pelvic and Breast Exam  2021   • LIPID PANEL  2021   • COLONOSCOPY  2023   • PAP SMEAR  2023   • TDAP/TD VACCINES (2 - Td) 12/10/2027   • HEPATITIS C SCREENING  Completed   • Pneumococcal Vaccine 0-64  Completed   • INFLUENZA VACCINE  Completed   • MENINGOCOCCAL VACCINE  Aged Out       Past Medical History:   Diagnosis Date   • Acid reflux    • ADHD (attention deficit hyperactivity disorder)    • Allergic rhinitis    • Anemia    • Anxiety    • Asthma    • Cervical dysplasia     stage 0-1   • Constipation    • Cubital tunnel syndrome 2018   • Headache    • 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 with cold snare polypectomy;  Surgeon: Sarah Velasco MD;  Location:  [facility];  Service:    • D&C WITH SUCTION     • ENDOMETRIAL ABLATION     • ESSURE TUBAL LIGATION     • GALLBLADDER SURGERY  1996   • HYSTEROSCOPY ENDOMETRIAL ABLATION N/A 6/14/2017    Procedure: HYSTEROSCOPY WITH ENDOMETRIAL ABLATION;  Surgeon: Sarah Beth Ray DO;  Location:  [facility];  Service:    • OTHER SURGICAL HISTORY      DENTAL SURGERY   • TONSILLECTOMY     • TONSILLECTOMY           Current Outpatient Medications:   •  albuterol (PROVENTIL HFA;VENTOLIN HFA) 108 (90 BASE) MCG/ACT inhaler, Inhale 2 puffs Every 4 (Four) Hours As Needed for Wheezing or Shortness of Air., Disp: , Rfl:   •  ALPRAZolam (XANAX) 0.5 MG tablet, Take 0.5 mg by mouth At Night As Needed., Disp: , Rfl: 2  •  amphetamine-dextroamphetamine XR (ADDERALL XR) 20 MG 24 hr capsule, TAKE ONE CAPSULE BY MOUTH TWICE A DAY, Disp: , Rfl: 0  •  atorvastatin (LIPITOR) 40 MG tablet, Take 1 tablet by mouth Every Night., Disp: 90 tablet, Rfl: 1  •  Cholecalciferol (Vitamin D3) 1.25 MG (43072 UT) capsule, One cap po Q7Days for 12 weeks for Vitamin D deficieincy, Disp: 12 capsule, Rfl: 3  •  Docusate Calcium (STOOL SOFTENER PO), Take  by mouth., Disp: , Rfl:   •  fluticasone (Flonase Sensimist) 27.5 MCG/SPRAY nasal spray, 2 sprays into the nostril(s) as directed by provider Daily., Disp: , Rfl:   •  lamoTRIgine (LaMICtal) 25 MG tablet, 2 TABLETS EVERY MORNING, Disp: , Rfl:   •  levothyroxine (SYNTHROID, LEVOTHROID) 75 MCG tablet, Take 1 tablet by mouth Daily., Disp: 90 tablet, Rfl: 1  •  montelukast (SINGULAIR) 10 MG tablet, Take 1 tablet by mouth Every Night., Disp: 90 tablet, Rfl: 1  •  Probiotic Product (PROBIOTIC COLON SUPPORT) capsule, , Disp: , Rfl:   •  spironolactone (Aldactone) 50 MG tablet, Take 1 tablet by mouth Daily., Disp: 90 tablet, Rfl: 3    Allergies   Allergen Reactions   •  Cannabinoids Diarrhea, Nausea And Vomiting, Rash, Shortness Of Breath and Swelling   • Cat Hair Extract Anaphylaxis   • Hops Oil Dermatitis, Rash and Anaphylaxis   • Marijuana (Cannabis Sativa) Anaphylaxis   • Mushroom Extract Complex Anaphylaxis, Nausea And Vomiting, Shortness Of Breath and Hives   • Avocado (Diagnostic) Unknown (See Comments)   • Citrullus Vulgaris Other (See Comments)   • Citrus Nausea And Vomiting and Other (See Comments)   • Doxycycline Other (See Comments)     Second degree burns on the inside   • Molds & Smuts Hives   • Pollen Extract Hives   • Short Ragweed Pollen Ext Hives   • Strawberry (Diagnostic) Itching, Nausea And Vomiting, Swelling and Tinnitus   • Amoxicillin Itching and Rash   • Dust Mite Extract Rash   • Flavoring Agent Hives and Rash   • Grass Cough, Dermatitis, Hives, Itching, Rash, Swelling and Tinnitus   • Zacarias Flavor Dermatitis and Rash   • Tree Extract Cough, Hives, Itching, Rash and Tinnitus       Social History     Tobacco Use   • Smoking status: Current Every Day Smoker     Packs/day: 0.50     Years: 25.00     Pack years: 12.50     Types: Cigarettes   • Smokeless tobacco: Never Used   Substance Use Topics   • Alcohol use: Yes     Comment: occasionally   • Drug use: No       Family History   Problem Relation Age of Onset   • Arthritis Mother    • Depression Mother    • Hyperlipidemia Mother    • Hypertension Mother    • Sleep apnea Mother    • Anesthesia problems Mother    • Arthritis Father    • Depression Father    • Hyperlipidemia Father    • Mental illness Father    • Other Father         chronic constipation   • Mental illness Other    • Cancer Paternal Aunt    • Cancer Paternal Uncle    • Diabetes Maternal Grandmother    • Cancer Maternal Grandmother    • Bone cancer Maternal Grandmother    • Cancer Paternal Uncle    • No Known Problems Brother    • Bipolar disorder Daughter    • ADD / ADHD Son    • Dysmenorrhea Son    • Cancer Maternal Aunt    • Ovarian cancer  "Maternal Aunt    • Breast cancer Neg Hx        Review of Systems   Constitutional: Negative for appetite change, chills, fatigue, fever and unexpected weight change.   Gastrointestinal: Negative for abdominal distention, abdominal pain, anal bleeding, blood in stool, constipation, diarrhea, nausea and vomiting.   Genitourinary: Negative for dyspareunia, dysuria, menstrual problem, pelvic pain, vaginal bleeding, vaginal discharge and vaginal pain.       /68   Ht 175.3 cm (69\")   Wt 85.5 kg (188 lb 6.4 oz)   LMP 02/12/2021 (Exact Date)   BMI 27.82 kg/m²     Physical Exam  Vitals signs reviewed.   Constitutional:       Appearance: She is well-developed.   HENT:      Mouth/Throat:      Dentition: Normal dentition. No dental caries.   Cardiovascular:      Rate and Rhythm: Normal rate and regular rhythm.      Heart sounds: Normal heart sounds.   Pulmonary:      Effort: Pulmonary effort is normal. No respiratory distress.      Breath sounds: Normal breath sounds. No stridor. No wheezing.   Chest:      Breasts:         Right: No inverted nipple, mass, nipple discharge, skin change or tenderness.         Left: No inverted nipple, mass, nipple discharge, skin change or tenderness.   Abdominal:      General: There is no distension.      Palpations: Abdomen is soft. There is no mass.      Tenderness: There is no abdominal tenderness.   Genitourinary:     Labia:         Right: No rash, tenderness or lesion.         Left: No rash, tenderness or lesion.       Vagina: No vaginal discharge, tenderness or bleeding.      Cervix: No cervical motion tenderness, discharge or friability.      Uterus: Not deviated, not enlarged, not fixed and not tender.       Adnexa:         Right: No mass, tenderness or fullness.          Left: No mass, tenderness or fullness.        Comments: Multiple lesions of buttocks and groin c/w acne type lesions.  Musculoskeletal: Normal range of motion.         General: No tenderness.   Skin:     " General: Skin is warm.      Findings: No erythema or rash.   Neurological:      Mental Status: She is alert and oriented to person, place, and time.      Cranial Nerves: No cranial nerve deficit.      Coordination: Coordination normal.   Psychiatric:         Behavior: Behavior normal.         Thought Content: Thought content normal.         Judgment: Judgment normal.            Assessment/Plan    1) GYN HM: Check pap smear. MMG 11/2019. SBE demonstrated and encouraged. Pt had a polyp on colonoscopy 1 year ago and has to have another colonoscopy in 2 years.  2) STD screening: declines  3) Contraception: Essure  4) Hx of endometrial ablation  5) Diet and Exercise discussed  6) Smoking Status: smoking .5ppd  7) hand and LE swelling and fatigue: Pt improved symptoms on Synthroid and vitamin D.  8) acne of buttocks and groin: Start Spironolactone 50mg. Increase [id number] after 2 weeks if not dizzy or lightheaded.  9) Follow up prn and 1 year       Diagnoses and all orders for this visit:    Routine gynecological examination  -     IgP, Aptima HPV    Screening for unspecified condition  -     POC Urinalysis Dipstick  -     Mammo Screening Bilateral With CAD; Future    Special screening examination for human papillomavirus (HPV)  -     IgP, Aptima HPV    Pap smear, low-risk  -     IgP, Aptima HPV    Other orders  -     lamoTRIgine (LaMICtal) 25 MG tablet; 2 TABLETS EVERY MORNING  -     spironolactone (Aldactone) 50 MG tablet; Take 1 tablet by mouth Daily.          Sarah Beth Ray, DO  2/23/2021  12:20 EST

## 2021-02-25 ENCOUNTER — TRANSCRIBE ORDERS (OUTPATIENT)
Dept: ADMINISTRATIVE | Facility: HOSPITAL | Age: 46
End: 2021-02-25

## 2021-02-25 DIAGNOSIS — Z12.31 VISIT FOR SCREENING MAMMOGRAM: Primary | ICD-10-CM

## 2021-02-25 LAB
CYTOLOGIST CVX/VAG CYTO: NORMAL
CYTOLOGY CVX/VAG DOC CYTO: NORMAL
CYTOLOGY CVX/VAG DOC THIN PREP: NORMAL
DX ICD CODE: NORMAL
HIV 1 & 2 AB SER-IMP: NORMAL
HPV I/H RISK 4 DNA CVX QL PROBE+SIG AMP: NEGATIVE
OTHER STN SPEC: NORMAL
STAT OF ADQ CVX/VAG CYTO-IMP: NORMAL

## 2021-04-06 ENCOUNTER — BULK ORDERING (OUTPATIENT)
Dept: CASE MANAGEMENT | Facility: OTHER | Age: 46
End: 2021-04-06

## 2021-04-06 DIAGNOSIS — Z23 IMMUNIZATION DUE: ICD-10-CM

## 2021-04-09 ENCOUNTER — APPOINTMENT (OUTPATIENT)
Dept: MAMMOGRAPHY | Facility: HOSPITAL | Age: 46
End: 2021-04-09

## 2021-05-10 ENCOUNTER — HOSPITAL ENCOUNTER (OUTPATIENT)
Dept: MAMMOGRAPHY | Facility: HOSPITAL | Age: 46
Discharge: HOME | End: 2021-05-10
Admitting: OBSTETRICS & GYNECOLOGY

## 2021-05-10 DIAGNOSIS — Z12.31 VISIT FOR SCREENING MAMMOGRAM: ICD-10-CM

## 2021-05-10 PROCEDURE — 77067 SCR MAMMO BI INCL CAD: CPT

## 2021-05-10 PROCEDURE — 77063 BREAST TOMOSYNTHESIS BI: CPT

## 2021-05-19 DIAGNOSIS — Z91.09 ENVIRONMENTAL ALLERGIES: ICD-10-CM

## 2021-05-19 RX ORDER — LEVOTHYROXINE SODIUM 0.07 MG/1
75 TABLET ORAL DAILY
Qty: 90 TABLET | Refills: 3 | Status: SHIPPED | OUTPATIENT
Start: 2021-05-19 | End: 2023-03-07

## 2021-05-19 RX ORDER — MONTELUKAST SODIUM 10 MG/1
10 TABLET ORAL NIGHTLY
Qty: 90 TABLET | Refills: 3 | Status: SHIPPED | OUTPATIENT
Start: 2021-05-19

## 2021-05-19 RX ORDER — CHOLECALCIFEROL (VITAMIN D3) 1250 MCG
CAPSULE ORAL
Qty: 12 CAPSULE | Refills: 3 | Status: SHIPPED | OUTPATIENT
Start: 2021-05-19

## 2021-06-03 ENCOUNTER — OFFICE VISIT (OUTPATIENT)
Dept: FAMILY MEDICINE CLINIC | Facility: CLINIC | Age: 46
End: 2021-06-03

## 2021-06-03 VITALS
DIASTOLIC BLOOD PRESSURE: 76 MMHG | HEART RATE: 92 BPM | HEIGHT: 69 IN | TEMPERATURE: 98.3 F | BODY MASS INDEX: 27.55 KG/M2 | SYSTOLIC BLOOD PRESSURE: 116 MMHG | OXYGEN SATURATION: 99 % | WEIGHT: 186 LBS

## 2021-06-03 DIAGNOSIS — Z00.00 ENCOUNTER FOR HEALTH MAINTENANCE EXAMINATION IN ADULT: Primary | ICD-10-CM

## 2021-06-03 DIAGNOSIS — Z13.0 SCREENING FOR DEFICIENCY ANEMIA: ICD-10-CM

## 2021-06-03 DIAGNOSIS — Z11.59 NEED FOR HEPATITIS C SCREENING TEST: ICD-10-CM

## 2021-06-03 DIAGNOSIS — E55.9 VITAMIN D DEFICIENCY: ICD-10-CM

## 2021-06-03 DIAGNOSIS — E03.9 ACQUIRED HYPOTHYROIDISM: ICD-10-CM

## 2021-06-03 DIAGNOSIS — Z13.220 SCREENING FOR HYPERLIPIDEMIA: ICD-10-CM

## 2021-06-03 PROCEDURE — 99396 PREV VISIT EST AGE 40-64: CPT | Performed by: FAMILY MEDICINE

## 2021-06-03 RX ORDER — LAMOTRIGINE 200 MG/1
200 TABLET ORAL
COMMUNITY
Start: 2021-04-22 | End: 2023-03-07

## 2021-06-03 NOTE — PROGRESS NOTES
Nehal Roberts is a 45 y.o. female.     Chief Complaint   Patient presents with   • Annual Exam     physical - fasting     Masks/face shield/appropriate PPE were worn for the entirety of the visit by the patient, MA, and provider.     SHALA Ling is a pleasant 45 y.o. YO female here for an annual physical.  Her chronic medical problems include multiple allergies, hypothyroidism, vitamin D deficiency, as well as ADHD and bipolar disorder for which she sees psychiatry.    Health Maintenance   Topic Date Due   • INFLUENZA VACCINE  08/01/2021   • LIPID PANEL  12/03/2021   • ANNUAL PHYSICAL  06/04/2022   • COLORECTAL CANCER SCREENING  01/17/2023   • PAP SMEAR  02/23/2024   • TDAP/TD VACCINES (2 - Td or Tdap) 12/10/2027   • HEPATITIS C SCREENING  Completed   • COVID-19 Vaccine  Completed   • Pneumococcal Vaccine 0-64  Completed       Diet: Eats a fairly healthy diet, typically prepares most meals at home.  Eats out maybe once per week.    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  GYN: Up to date, follows with gynecology, Dr. Ray.  Her last appointment was February 2021, underwent Pap smear screening and mammogram screening both of which were normal.  Immunizations: Up-to-date including COVID-19, Tdap, and pneumococcal.    The following portions of the patient's history were reviewed by me and updated as appropriate: allergies, current medications, past family history, past medical history, past social history, past surgical history, and problem list.     Review of Systems   Constitutional: Negative.    HENT: Negative.    Eyes: Negative.    Respiratory: Negative.    Cardiovascular: Negative.    Gastrointestinal: Negative.    Endocrine: Negative.    Genitourinary: Negative.    Musculoskeletal: Negative.    Skin: Negative.    Allergic/Immunologic: Negative.    Neurological: Negative.    Hematological: Negative.     Psychiatric/Behavioral: Negative.    I have reviewed and confirmed the accuracy of the ROS as documented by the MA/LPN/RN Delma Khan MD    Objective  Vitals:    06/03/21 0913   BP: 116/76   Pulse: 92   Temp: 98.3 °F (36.8 °C)   SpO2: 99%     Body mass index is 27.47 kg/m².       Physical Exam  Vitals reviewed.   Constitutional:       General: She is not in acute distress.     Appearance: She is well-developed.   HENT:      Head: Normocephalic and atraumatic.      Right Ear: Tympanic membrane, ear canal and external ear normal.      Left Ear: Tympanic membrane, ear canal and external ear normal.      Nose: Nose normal.   Eyes:      General: No scleral icterus.        Right eye: No discharge.         Left eye: No discharge.      Conjunctiva/sclera: Conjunctivae normal.   Neck:      Thyroid: No thyromegaly.   Cardiovascular:      Rate and Rhythm: Normal rate and regular rhythm.      Heart sounds: No murmur heard.   No friction rub. No gallop.    Pulmonary:      Effort: Pulmonary effort is normal. No respiratory distress.      Breath sounds: Normal breath sounds. No wheezing or rales.   Abdominal:      General: Abdomen is flat. Bowel sounds are normal. There is no distension.      Palpations: Abdomen is soft. There is no mass.      Tenderness: There is no abdominal tenderness.   Musculoskeletal:      Cervical back: Neck supple.   Lymphadenopathy:      Cervical: No cervical adenopathy.   Skin:     General: Skin is warm and dry.   Neurological:      Mental Status: She is alert and oriented to person, place, and time.   Psychiatric:         Behavior: Behavior normal.         Thought Content: Thought content normal.         Judgment: Judgment normal.           Current Outpatient Medications:   •  albuterol (PROVENTIL HFA;VENTOLIN HFA) 108 (90 BASE) MCG/ACT inhaler, Inhale 2 puffs Every 4 (Four) Hours As Needed for Wheezing or Shortness of Air., Disp: , Rfl:   •  ALPRAZolam (XANAX) 0.5 MG tablet, Take 0.5 mg by  mouth At Night As Needed., Disp: , Rfl: 2  •  amphetamine-dextroamphetamine XR (ADDERALL XR) 20 MG 24 hr capsule, TAKE ONE CAPSULE BY MOUTH TWICE A DAY, Disp: , Rfl: 0  •  atorvastatin (LIPITOR) 40 MG tablet, Take 1 tablet by mouth Every Night., Disp: 90 tablet, Rfl: 1  •  Cholecalciferol (Vitamin D3) 1.25 MG (47350 UT) capsule, One cap po Q7Days for 12 weeks for Vitamin D deficieincy, Disp: 12 capsule, Rfl: 3  •  Docusate Calcium (STOOL SOFTENER PO), Take  by mouth., Disp: , Rfl:   •  fluticasone (Flonase Sensimist) 27.5 MCG/SPRAY nasal spray, 2 sprays into the nostril(s) as directed by provider Daily., Disp: , Rfl:   •  lamoTRIgine (LaMICtal) 200 MG tablet, Take 200 mg by mouth every night at bedtime., Disp: , Rfl:   •  lamoTRIgine (LaMICtal) 25 MG tablet, 2 TABLETS EVERY MORNING, Disp: , Rfl:   •  levothyroxine (SYNTHROID, LEVOTHROID) 75 MCG tablet, Take 1 tablet by mouth Daily., Disp: 90 tablet, Rfl: 3  •  montelukast (SINGULAIR) 10 MG tablet, Take 1 tablet by mouth Every Night., Disp: 90 tablet, Rfl: 3  •  Probiotic Product (PROBIOTIC COLON SUPPORT) capsule, , Disp: , Rfl:   •  spironolactone (Aldactone) 50 MG tablet, Take 1 tablet by mouth Daily., Disp: 90 tablet, Rfl: 3    Procedures    Lab Results (most recent)     None              Diagnoses and all orders for this visit:    Encounter for health maintenance examination in adult  Pleasant 45-year-old female here today for routine health maintenance exam.  She is up-to-date with recommended immunizations and screenings.  She eats a healthy diet and exercises regularly, her blood pressure is excellent.  We will check thyroid levels and vitamin D due to hypothyroidism and vitamin D deficiency.  We will also do screening labs including a lipid panel, CBC, hep C, and CMP as ordered below.  -     CBC & Differential  -     Comprehensive Metabolic Panel  -     Lipid Panel  -     TSH  -     T4, Free    Vitamin D deficiency  -     Vitamin D 25 hydroxy    Acquired  hypothyroidism  -     TSH  -     T4, Free    Screening for deficiency anemia  -     CBC & Differential    Screening for hyperlipidemia  -     Lipid Panel    Need for hepatitis C screening test  -     Hepatitis C antibody    No follow-ups on file.  Patient is aware that I will be leaving the practice in several weeks and that she will need to establish with a new provider for further follow-ups.      Delma Khan MD

## 2021-06-04 LAB
25(OH)D3+25(OH)D2 SERPL-MCNC: 45.7 NG/ML (ref 30–100)
ALBUMIN SERPL-MCNC: 4.6 G/DL (ref 3.5–5.2)
ALBUMIN/GLOB SERPL: 2.1 G/DL
ALP SERPL-CCNC: 74 U/L (ref 39–117)
ALT SERPL-CCNC: 30 U/L (ref 1–33)
AST SERPL-CCNC: 17 U/L (ref 1–32)
BASOPHILS # BLD AUTO: 0.05 10*3/MM3 (ref 0–0.2)
BASOPHILS NFR BLD AUTO: 0.5 % (ref 0–1.5)
BILIRUB SERPL-MCNC: 0.6 MG/DL (ref 0–1.2)
BUN SERPL-MCNC: 10 MG/DL (ref 6–20)
BUN/CREAT SERPL: 11.4 (ref 7–25)
CALCIUM SERPL-MCNC: 9.7 MG/DL (ref 8.6–10.5)
CHLORIDE SERPL-SCNC: 102 MMOL/L (ref 98–107)
CHOLEST SERPL-MCNC: 146 MG/DL (ref 0–200)
CO2 SERPL-SCNC: 25.6 MMOL/L (ref 22–29)
CREAT SERPL-MCNC: 0.88 MG/DL (ref 0.57–1)
EOSINOPHIL # BLD AUTO: 0.32 10*3/MM3 (ref 0–0.4)
EOSINOPHIL NFR BLD AUTO: 3.3 % (ref 0.3–6.2)
ERYTHROCYTE [DISTWIDTH] IN BLOOD BY AUTOMATED COUNT: 11.9 % (ref 12.3–15.4)
GLOBULIN SER CALC-MCNC: 2.2 GM/DL
GLUCOSE SERPL-MCNC: 79 MG/DL (ref 65–99)
HCT VFR BLD AUTO: 47.1 % (ref 34–46.6)
HCV AB S/CO SERPL IA: <0.1 S/CO RATIO (ref 0–0.9)
HDLC SERPL-MCNC: 33 MG/DL (ref 40–60)
HGB BLD-MCNC: 15.9 G/DL (ref 12–15.9)
IMM GRANULOCYTES # BLD AUTO: 0.03 10*3/MM3 (ref 0–0.05)
IMM GRANULOCYTES NFR BLD AUTO: 0.3 % (ref 0–0.5)
LDLC SERPL CALC-MCNC: 99 MG/DL (ref 0–100)
LYMPHOCYTES # BLD AUTO: 2.13 10*3/MM3 (ref 0.7–3.1)
LYMPHOCYTES NFR BLD AUTO: 21.9 % (ref 19.6–45.3)
MCH RBC QN AUTO: 32.6 PG (ref 26.6–33)
MCHC RBC AUTO-ENTMCNC: 33.8 G/DL (ref 31.5–35.7)
MCV RBC AUTO: 96.7 FL (ref 79–97)
MONOCYTES # BLD AUTO: 0.88 10*3/MM3 (ref 0.1–0.9)
MONOCYTES NFR BLD AUTO: 9 % (ref 5–12)
NEUTROPHILS # BLD AUTO: 6.33 10*3/MM3 (ref 1.7–7)
NEUTROPHILS NFR BLD AUTO: 65 % (ref 42.7–76)
NRBC BLD AUTO-RTO: 0 /100 WBC (ref 0–0.2)
PLATELET # BLD AUTO: 352 10*3/MM3 (ref 140–450)
POTASSIUM SERPL-SCNC: 4.5 MMOL/L (ref 3.5–5.2)
PROT SERPL-MCNC: 6.8 G/DL (ref 6–8.5)
RBC # BLD AUTO: 4.87 10*6/MM3 (ref 3.77–5.28)
SODIUM SERPL-SCNC: 137 MMOL/L (ref 136–145)
T4 FREE SERPL-MCNC: 1.54 NG/DL (ref 0.93–1.7)
TRIGL SERPL-MCNC: 72 MG/DL (ref 0–150)
TSH SERPL DL<=0.005 MIU/L-ACNC: 2.33 UIU/ML (ref 0.27–4.2)
VLDLC SERPL CALC-MCNC: 14 MG/DL (ref 5–40)
WBC # BLD AUTO: 9.74 10*3/MM3 (ref 3.4–10.8)

## 2022-02-14 RX ORDER — SPIRONOLACTONE 50 MG/1
TABLET, FILM COATED ORAL
Qty: 90 TABLET | Refills: 0 | Status: SHIPPED | OUTPATIENT
Start: 2022-02-14 | End: 2022-03-01 | Stop reason: SDUPTHER

## 2022-03-01 ENCOUNTER — OFFICE VISIT (OUTPATIENT)
Dept: OBSTETRICS AND GYNECOLOGY | Facility: CLINIC | Age: 47
End: 2022-03-01

## 2022-03-01 VITALS
DIASTOLIC BLOOD PRESSURE: 76 MMHG | SYSTOLIC BLOOD PRESSURE: 104 MMHG | BODY MASS INDEX: 26.81 KG/M2 | HEIGHT: 69 IN | WEIGHT: 181 LBS

## 2022-03-01 DIAGNOSIS — Z11.51 SPECIAL SCREENING EXAMINATION FOR HUMAN PAPILLOMAVIRUS (HPV): ICD-10-CM

## 2022-03-01 DIAGNOSIS — Z01.419 ROUTINE GYNECOLOGICAL EXAMINATION: Primary | ICD-10-CM

## 2022-03-01 DIAGNOSIS — Z01.419 PAP SMEAR, LOW-RISK: ICD-10-CM

## 2022-03-01 DIAGNOSIS — Z13.9 SCREENING FOR UNSPECIFIED CONDITION: ICD-10-CM

## 2022-03-01 LAB
BILIRUB BLD-MCNC: NEGATIVE MG/DL
CLARITY, POC: CLEAR
COLOR UR: YELLOW
GLUCOSE UR STRIP-MCNC: NEGATIVE MG/DL
KETONES UR QL: NEGATIVE
LEUKOCYTE EST, POC: NEGATIVE
NITRITE UR-MCNC: NEGATIVE MG/ML
PH UR: 8 [PH] (ref 5–8)
PROT UR STRIP-MCNC: NEGATIVE MG/DL
RBC # UR STRIP: NEGATIVE /UL
SP GR UR: 1 (ref 1–1.03)
UROBILINOGEN UR QL: NORMAL

## 2022-03-01 PROCEDURE — 99396 PREV VISIT EST AGE 40-64: CPT | Performed by: OBSTETRICS & GYNECOLOGY

## 2022-03-01 PROCEDURE — 81002 URINALYSIS NONAUTO W/O SCOPE: CPT | Performed by: OBSTETRICS & GYNECOLOGY

## 2022-03-01 RX ORDER — SPIRONOLACTONE 50 MG/1
50 TABLET, FILM COATED ORAL DAILY
Qty: 90 TABLET | Refills: 3 | Status: SHIPPED | OUTPATIENT
Start: 2022-03-01 | End: 2023-03-07 | Stop reason: SDUPTHER

## 2022-03-01 NOTE — PROGRESS NOTES
GYN Annual Exam     CC- Here for annual exam.     Nehal Roberts is a 46 y.o. female who presents for annual well woman exam. Periods are regular every 28-30 days, lasting 3 days. Light cycles. Dysmenorrhea:mild, occurring first 1-2 days of flow. Cyclic symptoms include none. No intermenstrual bleeding, spotting, or discharge.  Patient is sexually active  yes -  . Patient is satisfied with her contraception yes - BTL. Hx of endometrial ablation. Pt still reporting improved LE and hand swelling. She is now on Synthroid 75mcg and doing so much better. Complained of acne on buttocks and groin last year and started on Spironolactone 50mg and reports improved sx.    OB History        3    Para   2    Term   2            AB        Living   2       SAB        IAB        Ectopic        Molar        Multiple        Live Births                      Current contraception: Essure  History of abnormal Pap smear: yes - remote  History of abnormal mammogram: no  Family history of uterine, colon or ovarian cancer: maternal GM dx with colon cancer.  Family history of breast cancer: no    Health Maintenance   Topic Date Due   • Annual Gynecologic Pelvic and Breast Exam  2022   • LIPID PANEL  2022   • ANNUAL PHYSICAL  2022   • COLORECTAL CANCER SCREENING  2023   • PAP SMEAR  2024   • TDAP/TD VACCINES (2 - Td or Tdap) 12/10/2027   • Pneumococcal Vaccine 0-64 (2 of 2 - PPSV23) 10/20/2042   • HEPATITIS C SCREENING  Completed   • COVID-19 Vaccine  Completed   • INFLUENZA VACCINE  Completed       Past Medical History:   Diagnosis Date   • Acid reflux    • ADHD (attention deficit hyperactivity disorder)    • Allergic rhinitis    • Anemia    • Anxiety    • Asthma    • Cervical dysplasia     stage 0-1   • Constipation    • Cubital tunnel syndrome 2018   • Headache    • 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 with cold snare polypectomy;  Surgeon: Sarah Velasco MD;  Location:  [facility];  Service:    • D & C WITH SUCTION     • ENDOMETRIAL ABLATION     • ESSURE TUBAL LIGATION     • GALLBLADDER SURGERY  1996   • HYSTEROSCOPY ENDOMETRIAL ABLATION N/A 6/14/2017    Procedure: HYSTEROSCOPY WITH ENDOMETRIAL ABLATION;  Surgeon: Sarah Beth Ray DO;  Location:  [facility];  Service:    • OTHER SURGICAL HISTORY      DENTAL SURGERY   • TONSILLECTOMY     • TONSILLECTOMY           Current Outpatient Medications:   •  albuterol (PROVENTIL HFA;VENTOLIN HFA) 108 (90 BASE) MCG/ACT inhaler, Inhale 2 puffs Every 4 (Four) Hours As Needed for Wheezing or Shortness of Air., Disp: , Rfl:   •  ALPRAZolam (XANAX) 0.5 MG tablet, Take 0.5 mg by mouth At Night As Needed., Disp: , Rfl: 2  •  amphetamine-dextroamphetamine XR (ADDERALL XR) 20 MG 24 hr capsule, TAKE ONE CAPSULE BY MOUTH TWICE A DAY, Disp: , Rfl: 0  •  atorvastatin (LIPITOR) 40 MG tablet, Take 1 tablet by mouth Every Night., Disp: 90 tablet, Rfl: 3  •  Cholecalciferol (Vitamin D3) 1.25 MG (40929 UT) capsule, One cap po Q7Days for 12 weeks for Vitamin D deficieincy, Disp: 12 capsule, Rfl: 3  •  Docusate Calcium (STOOL SOFTENER PO), Take  by mouth., Disp: , Rfl:   •  fluticasone (Flonase Sensimist) 27.5 MCG/SPRAY nasal spray, 2 sprays into the nostril(s) as directed by provider Daily., Disp: , Rfl:   •  lamoTRIgine (LaMICtal) 200 MG tablet, Take 200 mg by mouth every night at bedtime., Disp: , Rfl:   •  lamoTRIgine (LaMICtal) 25 MG tablet, 2 TABLETS EVERY MORNING, Disp: , Rfl:   •  levothyroxine (SYNTHROID, LEVOTHROID) 75 MCG tablet, Take 1 tablet by mouth Daily., Disp: 90 tablet, Rfl: 3  •  montelukast (SINGULAIR) 10 MG tablet, Take 1 tablet by mouth Every Night., Disp: 90 tablet, Rfl: 3  •  Probiotic Product (PROBIOTIC COLON SUPPORT) capsule, , Disp: , Rfl:   •  spironolactone  (ALDACTONE) 50 MG tablet, Take 1 tablet by mouth Daily., Disp: 90 tablet, Rfl: 3    Allergies   Allergen Reactions   • Cannabinoids Diarrhea, Nausea And Vomiting, Rash, Shortness Of Breath and Swelling   • Cat Hair Extract Anaphylaxis   • Hops Oil Dermatitis, Rash and Anaphylaxis   • Marijuana (Cannabis Sativa) Anaphylaxis   • Mushroom Extract Complex Anaphylaxis, Nausea And Vomiting, Shortness Of Breath and Hives   • Avocado (Diagnostic) Unknown (See Comments)   • Citrullus Vulgaris Other (See Comments)   • Citrus Nausea And Vomiting and Other (See Comments)   • Doxycycline Other (See Comments)     Second degree burns on the inside   • Molds & Smuts Hives   • Pollen Extract Hives   • Short Ragweed Pollen Ext Hives   • Strawberry (Diagnostic) Itching, Nausea And Vomiting, Swelling and Tinnitus   • Amoxicillin Itching and Rash   • Dust Mite Extract Rash   • Flavoring Agent Hives and Rash   • Grass Cough, Dermatitis, Hives, Itching, Rash, Swelling and Tinnitus   • [place] Flavor Dermatitis and Rash   • Tree Extract Cough, Hives, Itching, Rash and Tinnitus       Social History     Tobacco Use   • Smoking status: Current Every Day Smoker     Packs/day: 0.50     Years: 25.00     Pack years: 12.50     Types: Cigarettes   • Smokeless tobacco: Never Used   Substance Use Topics   • Alcohol use: Yes     Comment: occasionally   • Drug use: No       Family History   Problem Relation Age of Onset   • Arthritis Mother    • Depression Mother    • Hyperlipidemia Mother    • Hypertension Mother    • Sleep apnea Mother    • Anesthesia problems Mother    • Arthritis Father    • Depression Father    • Hyperlipidemia Father    • Mental illness Father    • Other Father         chronic constipation   • Mental illness Other    • Cancer Paternal Aunt    • Cancer Paternal Uncle    • Diabetes Maternal Grandmother    • Cancer Maternal Grandmother    • Bone cancer Maternal Grandmother    • Cancer Paternal Uncle    • No Known Problems Brother    •  "Bipolar disorder Daughter    • ADD / ADHD Son    • Dysmenorrhea Son    • Cancer Maternal Aunt    • Ovarian cancer Maternal Aunt    • Breast cancer Neg Hx        Review of Systems   Constitutional: Negative for appetite change, chills, fatigue, fever and unexpected weight change.   Gastrointestinal: Negative for abdominal distention, abdominal pain, anal bleeding, blood in stool, constipation, diarrhea, nausea and vomiting.   Genitourinary: Negative for dyspareunia, dysuria, menstrual problem, pelvic pain, vaginal bleeding, vaginal discharge and vaginal pain.       /76   Ht 175.3 cm (69\")   Wt 82.1 kg (181 lb)   BMI 26.73 kg/m²     Physical Exam  Vitals reviewed.   Constitutional:       General: She is not in acute distress.     Appearance: Normal appearance. She is well-developed. She is not ill-appearing, toxic-appearing or diaphoretic.   HENT:      Mouth/Throat:      Dentition: Normal dentition. No dental caries.   Cardiovascular:      Rate and Rhythm: Normal rate and regular rhythm.      Heart sounds: Normal heart sounds.   Pulmonary:      Effort: Pulmonary effort is normal. No respiratory distress.      Breath sounds: Normal breath sounds. No stridor. No wheezing.   Chest:   Breasts:      Right: No inverted nipple, mass, nipple discharge, skin change or tenderness.      Left: No inverted nipple, mass, nipple discharge, skin change or tenderness.       Abdominal:      General: There is no distension.      Palpations: Abdomen is soft. There is no mass.      Tenderness: There is no abdominal tenderness.   Genitourinary:     Labia:         Right: No rash, tenderness or lesion.         Left: No rash, tenderness or lesion.       Vagina: No vaginal discharge, tenderness or bleeding.      Cervix: No cervical motion tenderness, discharge or friability.      Uterus: Not deviated, not enlarged, not fixed and not tender.       Adnexa:         Right: No mass, tenderness or fullness.          Left: No mass, " tenderness or fullness.     Musculoskeletal:         General: No tenderness. Normal range of motion.   Skin:     General: Skin is warm.      Findings: No erythema or rash.   Neurological:      General: No focal deficit present.      Mental Status: She is alert and oriented to person, place, and time. Mental status is at baseline.      Cranial Nerves: No cranial nerve deficit.      Coordination: Coordination normal.   Psychiatric:         Mood and Affect: Mood normal.         Behavior: Behavior normal.         Thought Content: Thought content normal.         Judgment: Judgment normal.            Assessment/Plan    1) GYN HM: Check pap smear. MMG 5/2021. SBE demonstrated and encouraged. Pt had a polyp on colonoscopy 2 year ago and has to have another colonoscopy in 1 year.  2) STD screening: declines  3) Contraception: Essure  4) Hx of endometrial ablation  5) Diet and Exercise discussed  6) Smoking Status: smoking .5ppd. Nehal Roberts  reports that she has been smoking cigarettes. She has a 12.50 pack-year smoking history. She has never used smokeless tobacco.. I have educated her on the risk of diseases from using tobacco products such as cancer, COPD and heart disease. I advised her to quit and she is not willing to quit.  I spent less than 3 minutes counseling the patient.  7) hand and LE swelling and fatigue: Pt improved symptoms on Synthroid and vitamin D.  8) acne of buttocks and groin: Improved on Spironolactone 50mg po daily.- refills given.  9) Follow up prn and 1 year       Diagnoses and all orders for this visit:    Routine gynecological examination  -     IgP, Aptima HPV    Screening for unspecified condition  -     POC Urinalysis Dipstick  -     Mammo Screening Bilateral With CAD; Future    Pap smear, low-risk  -     IgP, Aptima HPV    Special screening examination for human papillomavirus (HPV)  -     IgP, Aptima HPV    Other orders  -     spironolactone (ALDACTONE) 50 MG tablet; Take 1 tablet by  mouth Daily.          Sarah Beth Ray DO  3/1/2022  09:19 EST

## 2022-05-25 ENCOUNTER — APPOINTMENT (OUTPATIENT)
Dept: MAMMOGRAPHY | Facility: HOSPITAL | Age: 47
End: 2022-05-25

## 2022-05-27 ENCOUNTER — HOSPITAL ENCOUNTER (OUTPATIENT)
Dept: MAMMOGRAPHY | Facility: HOSPITAL | Age: 47
End: 2022-05-27

## 2022-05-31 ENCOUNTER — HOSPITAL ENCOUNTER (OUTPATIENT)
Dept: MAMMOGRAPHY | Facility: HOSPITAL | Age: 47
Discharge: HOME | End: 2022-05-31
Admitting: OBSTETRICS & GYNECOLOGY

## 2022-05-31 DIAGNOSIS — Z13.9 SCREENING FOR UNSPECIFIED CONDITION: ICD-10-CM

## 2022-05-31 PROCEDURE — 77067 SCR MAMMO BI INCL CAD: CPT

## 2022-05-31 PROCEDURE — 77063 BREAST TOMOSYNTHESIS BI: CPT

## 2022-12-15 ENCOUNTER — DOCUMENTATION (OUTPATIENT)
Dept: GASTROENTEROLOGY | Facility: CLINIC | Age: 47
End: 2022-12-15

## 2023-01-23 ENCOUNTER — TELEPHONE (OUTPATIENT)
Dept: GASTROENTEROLOGY | Facility: CLINIC | Age: 48
End: 2023-01-23
Payer: COMMERCIAL

## 2023-01-25 ENCOUNTER — PRE-PROCEDURE SCREENING (OUTPATIENT)
Dept: GASTROENTEROLOGY | Facility: CLINIC | Age: 48
End: 2023-01-25
Payer: COMMERCIAL

## 2023-03-07 ENCOUNTER — OFFICE VISIT (OUTPATIENT)
Dept: OBSTETRICS AND GYNECOLOGY | Facility: CLINIC | Age: 48
End: 2023-03-07
Payer: COMMERCIAL

## 2023-03-07 VITALS
SYSTOLIC BLOOD PRESSURE: 102 MMHG | BODY MASS INDEX: 27.02 KG/M2 | DIASTOLIC BLOOD PRESSURE: 72 MMHG | WEIGHT: 182.4 LBS | HEIGHT: 69 IN

## 2023-03-07 DIAGNOSIS — Z01.419 ROUTINE GYNECOLOGICAL EXAMINATION: ICD-10-CM

## 2023-03-07 DIAGNOSIS — Z11.51 SPECIAL SCREENING EXAMINATION FOR HUMAN PAPILLOMAVIRUS (HPV): ICD-10-CM

## 2023-03-07 DIAGNOSIS — Z01.419 PAP SMEAR, LOW-RISK: Primary | ICD-10-CM

## 2023-03-07 PROCEDURE — 81002 URINALYSIS NONAUTO W/O SCOPE: CPT | Performed by: OBSTETRICS & GYNECOLOGY

## 2023-03-07 PROCEDURE — 99396 PREV VISIT EST AGE 40-64: CPT | Performed by: OBSTETRICS & GYNECOLOGY

## 2023-03-07 RX ORDER — SPIRONOLACTONE 50 MG/1
50 TABLET, FILM COATED ORAL DAILY
Qty: 90 TABLET | Refills: 3 | Status: SHIPPED | OUTPATIENT
Start: 2023-03-07

## 2023-03-07 RX ORDER — LAMOTRIGINE 150 MG/1
TABLET ORAL
COMMUNITY
Start: 2023-02-01

## 2023-03-07 NOTE — PROGRESS NOTES
GYN Annual Exam     CC- Here for annual exam.     Nehal Roberts is a 47 y.o. female who presents for annual well woman exam. Periods are regular every 28-30 days, lasting 3 days. Light cycles. Dysmenorrhea:mild, occurring first 1-2 days of flow. Cyclic symptoms include none. No intermenstrual bleeding, spotting, or discharge.  Patient is sexually active  yes -  . Patient is satisfied with her contraception yes - BTL. Hx of endometrial ablation. Pt still reporting improved LE and hand swelling prn. She is now on Synthroid 88mcg and doing so much better. Complained of acne on buttocks and groin and started on Spironolactone 50mg and reports improved sx.    OB History        3    Para   2    Term   2            AB        Living   2       SAB        IAB        Ectopic        Molar        Multiple        Live Births                      Current contraception: Essure  History of abnormal Pap smear: yes - remote  History of abnormal mammogram: no  Family history of uterine, colon or ovarian cancer: maternal GM dx with colon cancer.  Family history of breast cancer: no    Health Maintenance   Topic Date Due   • COVID-19 Vaccine (4 - Booster for Moderna series) 2021   • ANNUAL PHYSICAL  2022   • COLORECTAL CANCER SCREENING  2023   • Annual Gynecologic Pelvic and Breast Exam  2023   • LIPID PANEL  2023   • PAP SMEAR  2025   • TDAP/TD VACCINES (2 - Td or Tdap) 12/10/2027   • Pneumococcal Vaccine 0-64 (3 - PPSV23 if available, else PCV20) 10/20/2042   • HEPATITIS C SCREENING  Completed   • INFLUENZA VACCINE  Completed       Past Medical History:   Diagnosis Date   • Acid reflux    • ADHD (attention deficit hyperactivity disorder)    • Allergic rhinitis    • Anemia    • Anxiety    • Asthma    • Cervical dysplasia     stage 0-1   • Constipation    • Cubital tunnel syndrome 2018   • Headache    • 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 with cold snare polypectomy;  Surgeon: Sarah Velasco MD;  Location: [facility];  Service:    • D & C WITH SUCTION     • ENDOMETRIAL ABLATION     • ESSURE TUBAL LIGATION     • GALLBLADDER SURGERY  1996   • HYSTEROSCOPY ENDOMETRIAL ABLATION N/A 6/14/2017    Procedure: HYSTEROSCOPY WITH ENDOMETRIAL ABLATION;  Surgeon: Sarah Beth Ray DO;  Location:  [facility];  Service:    • OTHER SURGICAL HISTORY      DENTAL SURGERY   • TONSILLECTOMY     • TONSILLECTOMY           Current Outpatient Medications:   •  lamoTRIgine (LaMICtal) 150 MG tablet, , Disp: , Rfl:   •  spironolactone (ALDACTONE) 50 MG tablet, Take 1 tablet by mouth Daily., Disp: 90 tablet, Rfl: 3  •  ALPRAZolam (XANAX) 0.5 MG tablet, Take 0.5 mg by mouth At Night As Needed., Disp: , Rfl: 2  •  amphetamine-dextroamphetamine XR (ADDERALL XR) 20 MG 24 hr capsule, TAKE ONE CAPSULE BY MOUTH TWICE A DAY, Disp: , Rfl: 0  •  atorvastatin (LIPITOR) 40 MG tablet, Take 1 tablet by mouth Every Night., Disp: 90 tablet, Rfl: 3  •  Cholecalciferol (Vitamin D3) 1.25 MG (26601 UT) capsule, One cap po Q7Days for 12 weeks for Vitamin D deficieincy, Disp: 12 capsule, Rfl: 3  •  Docusate Calcium (STOOL SOFTENER PO), Take  by mouth., Disp: , Rfl:   •  fluticasone (Flonase Sensimist) 27.5 MCG/SPRAY nasal spray, 2 sprays into the nostril(s) as directed by provider Daily., Disp: , Rfl:   •  montelukast (SINGULAIR) 10 MG tablet, Take 1 tablet by mouth Every Night., Disp: 90 tablet, Rfl: 3  •  Probiotic Product (PROBIOTIC COLON SUPPORT) capsule, , Disp: , Rfl:     Allergies   Allergen Reactions   • Cannabinoids Diarrhea, Nausea And Vomiting, Rash, Shortness Of Breath and Swelling   • Cat Hair Extract Anaphylaxis   • Hops Oil Dermatitis, Rash and Anaphylaxis   • Marijuana (Cannabis Sativa) Anaphylaxis   • Mushroom Extract Complex  Anaphylaxis, Nausea And Vomiting, Shortness Of Breath and Hives   • Avocado (Diagnostic) Unknown (See Comments)   • Citrullus Vulgaris Other (See Comments)   • Citrus Nausea And Vomiting and Other (See Comments)   • Doxycycline Other (See Comments)     Second degree burns on the inside   • Molds & Smuts Hives   • Pollen Extract Hives   • Short Ragweed Pollen Ext Hives   • Strawberry (Diagnostic) Itching, Nausea And Vomiting, Swelling and Tinnitus   • Amoxicillin Itching and Rash   • Dust Mite Extract Rash   • Flavoring Agent Hives and Rash   • Grass Cough, Dermatitis, Hives, Itching, Rash, Swelling and Tinnitus   • [place] Flavor Dermatitis and Rash   • Tree Extract Cough, Hives, Itching, Rash and Tinnitus       Social History     Tobacco Use   • Smoking status: Every Day     Packs/day: 0.50     Years: 25.00     Pack years: 12.50     Types: Cigarettes   • Smokeless tobacco: Never   Substance Use Topics   • Alcohol use: Yes     Comment: occasionally   • Drug use: No       Family History   Problem Relation Age of Onset   • Arthritis Mother    • Depression Mother    • Hyperlipidemia Mother    • Hypertension Mother    • Sleep apnea Mother    • Anesthesia problems Mother    • Arthritis Father    • Depression Father    • Hyperlipidemia Father    • Mental illness Father    • Other Father         chronic constipation   • Mental illness Other    • Cancer Paternal Aunt    • Cancer Paternal Uncle    • Diabetes Maternal Grandmother    • Cancer Maternal Grandmother    • Bone cancer Maternal Grandmother    • Cancer Paternal Uncle    • No Known Problems Brother    • Bipolar disorder Daughter    • ADD / ADHD Son    • Dysmenorrhea Son    • Cancer Maternal Aunt    • Ovarian cancer Maternal Aunt    • Breast cancer Neg Hx        Review of Systems   Constitutional: Negative for appetite change, chills, fatigue, fever and unexpected weight change.   Gastrointestinal: Negative for abdominal distention, abdominal pain, anal bleeding, blood  "in stool, constipation, diarrhea, nausea and vomiting.   Genitourinary: Negative for dyspareunia, dysuria, menstrual problem, pelvic pain, vaginal bleeding, vaginal discharge and vaginal pain.       /72   Ht 175.3 cm (69\")   Wt 82.7 kg (182 lb 6.4 oz)   LMP 02/21/2023 (Exact Date)   BMI 26.94 kg/m²     Physical Exam  Vitals reviewed.   Constitutional:       General: She is not in acute distress.     Appearance: Normal appearance. She is well-developed. She is not ill-appearing, toxic-appearing or diaphoretic.   HENT:      Mouth/Throat:      Dentition: Normal dentition. No dental caries.   Cardiovascular:      Rate and Rhythm: Normal rate and regular rhythm.      Heart sounds: Normal heart sounds.   Pulmonary:      Effort: Pulmonary effort is normal. No respiratory distress.      Breath sounds: Normal breath sounds. No stridor. No wheezing.   Chest:   Breasts:     Right: No inverted nipple, mass, nipple discharge, skin change or tenderness.      Left: No inverted nipple, mass, nipple discharge, skin change or tenderness.   Abdominal:      General: There is no distension.      Palpations: Abdomen is soft. There is no mass.      Tenderness: There is no abdominal tenderness.   Genitourinary:     Labia:         Right: No rash, tenderness or lesion.         Left: No rash, tenderness or lesion.       Vagina: No vaginal discharge, tenderness or bleeding.      Cervix: No cervical motion tenderness, discharge or friability.      Uterus: Not deviated, not enlarged, not fixed and not tender.       Adnexa:         Right: No mass, tenderness or fullness.          Left: No mass, tenderness or fullness.     Musculoskeletal:         General: No tenderness. Normal range of motion.   Skin:     General: Skin is warm.      Findings: No erythema or rash.   Neurological:      General: No focal deficit present.      Mental Status: She is alert and oriented to person, place, and time. Mental status is at baseline.      Cranial " Nerves: No cranial nerve deficit.      Coordination: Coordination normal.   Psychiatric:         Mood and Affect: Mood normal.         Behavior: Behavior normal.         Thought Content: Thought content normal.         Judgment: Judgment normal.            Assessment/Plan    1) GYN HM: Check pap smear. MMG 5/2022. SBE demonstrated and encouraged. Pt had a polyp on colonoscopy 2018 and is due for another one.  2) STD screening: declines  3) Contraception: Essure  4) Hx of endometrial ablation  5) Diet and Exercise discussed  6) Smoking Status: smoking .5ppd. Nehal Roberts  reports that she has been smoking cigarettes. She has a 12.50 pack-year smoking history. She has never used smokeless tobacco.. I have educated her on the risk of diseases from using tobacco products such as cancer, COPD and heart disease. I advised her to quit and she is not willing to quit.  I spent less than 3 minutes counseling the patient.  7) hand and LE swelling and fatigue: Pt improved symptoms on Synthroid and vitamin D.  8) acne of buttocks and groin: Improved on Spironolactone 50mg po daily.- refills given.  9) Follow up prn and 1 year       Diagnoses and all orders for this visit:    Pap smear, low-risk  -     IGP, Apt HPV,rfx 16 / 18,45    Routine gynecological examination  -     POC Urinalysis Dipstick  -     IGP, Apt HPV,rfx 16 / 18,45  -     Mammo Screening Bilateral With CAD; Future    Special screening examination for human papillomavirus (HPV)  -     IGP, Apt HPV,rfx 16 / 18,45    Other orders  -     lamoTRIgine (LaMICtal) 150 MG tablet  -     spironolactone (ALDACTONE) 50 MG tablet; Take 1 tablet by mouth Daily.          Sarah Beth Ray DO  3/7/2023  10:04 EST

## 2023-03-14 LAB
CYTOLOGIST CVX/VAG CYTO: NORMAL
CYTOLOGY CVX/VAG DOC CYTO: NORMAL
CYTOLOGY CVX/VAG DOC THIN PREP: NORMAL
DX ICD CODE: NORMAL
HIV 1 & 2 AB SER-IMP: NORMAL
HPV I/H RISK 4 DNA CVX QL PROBE+SIG AMP: NEGATIVE
Lab: NORMAL
OTHER STN SPEC: NORMAL
STAT OF ADQ CVX/VAG CYTO-IMP: NORMAL

## 2023-03-21 NOTE — TELEPHONE ENCOUNTER
LAST SCOPE 1/18 IN Epic --Personal history of polyps--No family history of polyps or colon cancer--IBURPROFEN--Medications:            ALPRAZolam (XANAX) 0.5 MG tablet  amphetamine-dextroamphetamine XR (ADDERALL XR) 20 MG 24 hr capsule  atorvastatin (LIPITOR) 40 MG tablet    LEVOTHYROXINE  montelukast (SINGULAIR) 10 MG tablet  IBUPROFEN   spironolactone (ALDACTONE) 50 MG tablet   FEXOFENADINE         QUESTIONNAIRE SCREENING  SCAN IN  MEDIA & HAS BEEN SENT TO DOCTOR FOR REVIEW

## 2023-03-23 DIAGNOSIS — Z86.010 PERSONAL HISTORY OF COLONIC POLYPS: Primary | ICD-10-CM

## 2023-03-23 RX ORDER — SODIUM CHLORIDE, SODIUM LACTATE, POTASSIUM CHLORIDE, CALCIUM CHLORIDE 600; 310; 30; 20 MG/100ML; MG/100ML; MG/100ML; MG/100ML
30 INJECTION, SOLUTION INTRAVENOUS CONTINUOUS
OUTPATIENT
Start: 2023-03-23

## 2023-04-05 ENCOUNTER — TELEPHONE (OUTPATIENT)
Dept: GASTROENTEROLOGY | Facility: CLINIC | Age: 48
End: 2023-04-05
Payer: COMMERCIAL

## 2023-04-14 ENCOUNTER — TELEPHONE (OUTPATIENT)
Dept: GASTROENTEROLOGY | Facility: CLINIC | Age: 48
End: 2023-04-14
Payer: COMMERCIAL

## 2023-05-30 RX ORDER — SPIRONOLACTONE 50 MG/1
TABLET, FILM COATED ORAL
Qty: 90 TABLET | Refills: 3 | Status: SHIPPED | OUTPATIENT
Start: 2023-05-30

## 2024-04-05 ENCOUNTER — OFFICE VISIT (OUTPATIENT)
Dept: OBSTETRICS AND GYNECOLOGY | Facility: CLINIC | Age: 49
End: 2024-04-05
Payer: COMMERCIAL

## 2024-04-05 VITALS
WEIGHT: 186 LBS | BODY MASS INDEX: 27.55 KG/M2 | DIASTOLIC BLOOD PRESSURE: 86 MMHG | SYSTOLIC BLOOD PRESSURE: 124 MMHG | HEIGHT: 69 IN

## 2024-04-05 DIAGNOSIS — N95.1 PERIMENOPAUSAL VASOMOTOR SYMPTOMS: ICD-10-CM

## 2024-04-05 DIAGNOSIS — N39.3 STRESS INCONTINENCE OF URINE: ICD-10-CM

## 2024-04-05 DIAGNOSIS — Z11.51 SCREENING FOR HUMAN PAPILLOMAVIRUS: ICD-10-CM

## 2024-04-05 DIAGNOSIS — F17.200 SMOKER: ICD-10-CM

## 2024-04-05 DIAGNOSIS — Z12.31 BREAST CANCER SCREENING BY MAMMOGRAM: ICD-10-CM

## 2024-04-05 DIAGNOSIS — Z01.419 CERVICAL SMEAR, AS PART OF ROUTINE GYNECOLOGICAL EXAMINATION: ICD-10-CM

## 2024-04-05 DIAGNOSIS — Z01.419 ROUTINE GYNECOLOGICAL EXAMINATION: Primary | ICD-10-CM

## 2024-04-05 RX ORDER — LEVOTHYROXINE SODIUM 88 UG/1
1 TABLET ORAL DAILY
COMMUNITY
Start: 2024-02-16

## 2024-04-05 RX ORDER — ERGOCALCIFEROL 1.25 MG/1
CAPSULE ORAL
COMMUNITY
Start: 2024-03-26

## 2024-04-05 NOTE — PROGRESS NOTES
GYN Annual Exam     Chief Complaint   Patient presents with    Gynecologic Exam     ANNUAL       Nehal Roberts is a 48 y.o. female who presents for annual well woman exam. She is new to me - yes, but is an established patient of this practice. She is sexually active; . Currently using s/p BTL for contraception. She is happy with her contraception: Yes.  She is s/p endometrial ablation in 2017 for menorrhagia.    Periods are regular every 28-30 days, lasting 1 days; light spotting day 2-5. Skipped cycle twice in the past year.  Reports HF, night sweats, increased crying.  Dysmenorrhea: occasional. Cyclic symptoms include breast tenderness and moodiness. No intermenstrual bleeding, spotting, or discharge.  Performing SBE: yes; has skin tags around right nipple    HPI    OB History          4    Para   3    Term   2       1    AB   1    Living             SAB   1    IAB        Ectopic        Molar        Multiple        Live Births                    Last Pap: 3/2023- NIL  Last Mammogram: 2022- neg  Last Colonoscopy: 2024- neg  History of abnormal Pap smear: yes - 30+ years ago; dyplasia  Family history of uterine, colon or ovarian cancer: yes - mat Gma with colon; maternal aunt with ovarian  Family history of breast cancer: yes - maternal aunt in her 70's  History of abnormal mammogram: no  Gardasil Vaccine: missed    Past Medical History:   Diagnosis Date    Acid reflux     ADHD (attention deficit hyperactivity disorder)     Allergic rhinitis     Anemia     Anxiety     Asthma     Cervical dysplasia     stage 0-1    Constipation     Cubital tunnel syndrome 2018    Depression     Headache     Neuropathy associated with endocrine disorder 2020    Thyroid disorder        Past Surgical History:   Procedure Laterality Date    BREAST CYST EXCISION Right     COLONOSCOPY N/A 2018    Procedure: COLONOSCOPY to cecum and t.i. with cold snare polypectomy;  Surgeon: Sarah Velasco MD;   Location:  [facility];  Service:     COLONOSCOPY  01/2024    normal    D & C WITH SUCTION      ENDOMETRIAL ABLATION      ESSURE TUBAL LIGATION      GALLBLADDER SURGERY  1996    HYSTEROSCOPY ENDOMETRIAL ABLATION N/A 06/14/2017    Procedure: HYSTEROSCOPY WITH ENDOMETRIAL ABLATION;  Surgeon: Sarah Beth Ray DO;  [facility];  Service:     OTHER SURGICAL HISTORY      DENTAL SURGERY    TONSILLECTOMY      TONSILLECTOMY           Current Outpatient Medications:     levothyroxine (SYNTHROID, LEVOTHROID) 88 MCG tablet, Take 1 tablet by mouth Daily., Disp: , Rfl:     vitamin D (ERGOCALCIFEROL) 1.25 MG (33422 UT) capsule capsule, , Disp: , Rfl:     ALPRAZolam (XANAX) 0.5 MG tablet, Take 0.5 mg by mouth At Night As Needed., Disp: , Rfl: 2    amphetamine-dextroamphetamine XR (ADDERALL XR) 20 MG 24 hr capsule, TAKE ONE CAPSULE BY MOUTH TWICE A DAY, Disp: , Rfl: 0    atorvastatin (LIPITOR) 40 MG tablet, Take 1 tablet by mouth Every Night., Disp: 90 tablet, Rfl: 3    lamoTRIgine (LaMICtal) 150 MG tablet, , Disp: , Rfl:     montelukast (SINGULAIR) 10 MG tablet, Take 1 tablet by mouth Every Night., Disp: 90 tablet, Rfl: 3    Probiotic Product (PROBIOTIC COLON SUPPORT) capsule, , Disp: , Rfl:     spironolactone (ALDACTONE) 50 MG tablet, TAKE 1 TABLET BY MOUTH EVERY DAY, Disp: 90 tablet, Rfl: 3    Allergies   Allergen Reactions    Cannabinoids Diarrhea, Nausea And Vomiting, Rash, Shortness Of Breath and Swelling    Cat Hair Extract Anaphylaxis    Hops Oil Dermatitis, Rash and Anaphylaxis    Marijuana (Cannabis Sativa) Anaphylaxis    Mushroom Extract Complex Anaphylaxis, Nausea And Vomiting, Shortness Of Breath and Hives    Avocado (Diagnostic) Unknown (See Comments)    Citrullus Vulgaris Other (See Comments)    Citrus Nausea And Vomiting and Other (See Comments)    Doxycycline Other (See Comments)     Second degree burns on the inside    Molds & Smuts Hives    Pollen Extract Hives    Short Ragweed Pollen Ext Hives     "Strawberry (Diagnostic) Itching, Nausea And Vomiting, Swelling and Tinnitus    Amoxicillin Itching and Rash    Dust Mite Extract Rash    Flavoring Agent Hives and Rash    Grass Cough, Dermatitis, Hives, Itching, Rash, Swelling and Tinnitus    Zacarias Flavor Dermatitis and Rash    Tree Extract Cough, Hives, Itching, Rash and Tinnitus       Social History     Tobacco Use    Smoking status: Every Day     Current packs/day: 0.50     Average packs/day: 0.5 packs/day for 25.0 years (12.5 ttl pk-yrs)     Types: Cigarettes    Smokeless tobacco: Never    Tobacco comments:     Trying to quit- cut back to < 5 cigs/day   Vaping Use    Vaping status: Never Used   Substance Use Topics    Alcohol use: Yes     Comment: occasionally    Drug use: No       Family History   Problem Relation Age of Onset    Arthritis Father     Depression Father     Hyperlipidemia Father     Mental illness Father     Other Father         chronic constipation    Arthritis Mother     Depression Mother     Hyperlipidemia Mother     Hypertension Mother     Sleep apnea Mother     Anesthesia problems Mother     Osteoporosis Mother     Diabetes Mother     No Known Problems Brother     ADD / ADHD Son     Dysmenorrhea Son     Bipolar disorder Daughter     Diabetes Maternal Grandmother     Colon cancer Maternal Grandmother     Bone cancer Maternal Grandmother     Breast cancer Maternal Aunt     Ovarian cancer Maternal Aunt     Cancer Paternal Aunt     Cancer Paternal Uncle     Cancer Paternal Uncle     Mental illness Other     Uterine cancer Neg Hx        Review of Systems   Endocrine: Positive for heat intolerance.   Genitourinary:  Positive for urinary incontinence (occasional). Negative for breast discharge, breast lump, breast pain, decreased libido, menstrual problem and pelvic pain.   Psychiatric/Behavioral:  Positive for sleep disturbance and depressed mood.        /86   Ht 175.3 cm (69\")   Wt 84.4 kg (186 lb)   BMI 27.47 kg/m²     Physical " Exam  Vitals reviewed.   Constitutional:       General: She is awake. She is not in acute distress.     Appearance: She is not ill-appearing.   HENT:      Head: Normocephalic and atraumatic.   Eyes:      Conjunctiva/sclera: Conjunctivae normal.   Pulmonary:      Effort: Pulmonary effort is normal. No respiratory distress.   Chest:   Breasts:     Right: Normal.      Left: Normal.      Comments: Small skin tags x 2 noted in right acerola  Abdominal:      Palpations: Abdomen is soft. There is no mass.      Tenderness: There is no abdominal tenderness.   Genitourinary:     General: Normal vulva.      Exam position: Supine.      Pubic Area: No rash.       Labia:         Right: No rash.         Left: No rash.       Urethra: No urethral lesion.      Vagina: Normal.      Cervix: Normal.      Uterus: Normal.       Adnexa: Right adnexa normal and left adnexa normal.   Musculoskeletal:      Cervical back: Neck supple. No rigidity.   Lymphadenopathy:      Upper Body:      Right upper body: No axillary adenopathy.      Left upper body: No axillary adenopathy.      Lower Body: No right inguinal adenopathy. No left inguinal adenopathy.   Skin:     General: Skin is warm and dry.      Capillary Refill: Capillary refill takes less than 2 seconds.   Neurological:      Mental Status: She is alert and oriented to person, place, and time.   Psychiatric:         Mood and Affect: Mood and affect normal.         Behavior: Behavior normal.            Assessment     Well woman exam   Contraception management  Urinary incontinence  Perimenopausal symptoms    Plan   Well woman exam: Pap collected- Yes. Instructed on how to perform SBE. Recommend MVI daily.    Breast Health: Clinical breast exam & mammogram yearly, Self breast awareness. Schedule screening mammogram.  Colon health:  colonoscopy due at 47 y.o.  Contraception: s/p BTL  STD:  Desires STD screen today- No.   Gardasil: missed  Smoking status:  yes I advised Nehal of the risks of  continuing to use tobacco, and I provided her with tobacco cessation educational materials in the After Visit Summary.  She has cut back to 5 cigs/day.  During this visit, I spent 3 minutes counseling the patient regarding tobacco cessation.  Body mass index is 27.47 kg/m². Diet and exercised discussed.  9.    Urinary incontinence- stress UI; not bothersome; rec Kegel's    Follow-up as needed and 1 year for AE.    I spent 20 minutes caring for Nehal on this date of service. This time includes time spent by me in the following activities: preparing for the visit, reviewing tests, obtaining and/or reviewing a separately obtained history, performing a medically appropriate examination and/or evaluation, counseling and educating the patient/family/caregiver, ordering medications, tests, or procedures, and documenting information in the medical record.          Raquel Marie, APRN  4/5/2024  11:21 EDT

## 2024-04-10 LAB
CYTOLOGIST CVX/VAG CYTO: NORMAL
CYTOLOGY CVX/VAG DOC CYTO: NORMAL
CYTOLOGY CVX/VAG DOC THIN PREP: NORMAL
DX ICD CODE: NORMAL
HPV I/H RISK 4 DNA CVX QL PROBE+SIG AMP: NEGATIVE
Lab: NORMAL
OTHER STN SPEC: NORMAL
STAT OF ADQ CVX/VAG CYTO-IMP: NORMAL

## 2024-04-22 ENCOUNTER — HOSPITAL ENCOUNTER (OUTPATIENT)
Dept: MAMMOGRAPHY | Facility: HOSPITAL | Age: 49
Discharge: HOME | End: 2024-04-22
Admitting: NURSE PRACTITIONER
Payer: COMMERCIAL

## 2024-04-22 DIAGNOSIS — Z12.31 BREAST CANCER SCREENING BY MAMMOGRAM: ICD-10-CM

## 2024-04-22 PROCEDURE — 77067 SCR MAMMO BI INCL CAD: CPT

## 2024-04-22 PROCEDURE — 77063 BREAST TOMOSYNTHESIS BI: CPT

## 2025-04-21 ENCOUNTER — OFFICE VISIT (OUTPATIENT)
Dept: OBSTETRICS AND GYNECOLOGY | Facility: CLINIC | Age: 50
End: 2025-04-21
Payer: COMMERCIAL

## 2025-04-21 VITALS
DIASTOLIC BLOOD PRESSURE: 76 MMHG | BODY MASS INDEX: 27.55 KG/M2 | WEIGHT: 186 LBS | HEIGHT: 69 IN | SYSTOLIC BLOOD PRESSURE: 124 MMHG

## 2025-04-21 DIAGNOSIS — E03.9 ACQUIRED HYPOTHYROIDISM: ICD-10-CM

## 2025-04-21 DIAGNOSIS — F17.200 SMOKER: ICD-10-CM

## 2025-04-21 DIAGNOSIS — Z01.419 PAP SMEAR, AS PART OF ROUTINE GYNECOLOGICAL EXAMINATION: ICD-10-CM

## 2025-04-21 DIAGNOSIS — Z01.419 ROUTINE GYNECOLOGICAL EXAMINATION: Primary | ICD-10-CM

## 2025-04-21 DIAGNOSIS — R32 URINARY INCONTINENCE, UNSPECIFIED TYPE: ICD-10-CM

## 2025-04-21 DIAGNOSIS — N92.6 IRREGULAR PERIODS/MENSTRUAL CYCLES: ICD-10-CM

## 2025-04-21 DIAGNOSIS — Z11.51 SPECIAL SCREENING EXAMINATION FOR HUMAN PAPILLOMAVIRUS (HPV): ICD-10-CM

## 2025-04-21 DIAGNOSIS — R55 VASOMOTOR INSTABILITY: ICD-10-CM

## 2025-04-21 DIAGNOSIS — Z12.31 BREAST CANCER SCREENING BY MAMMOGRAM: ICD-10-CM

## 2025-04-21 DIAGNOSIS — Z80.9 FAMILY HISTORY OF CANCER: ICD-10-CM

## 2025-04-21 LAB
BILIRUB BLD-MCNC: NEGATIVE MG/DL
CLARITY, POC: CLEAR
COLOR UR: YELLOW
GLUCOSE UR STRIP-MCNC: NEGATIVE MG/DL
KETONES UR QL: NEGATIVE
LEUKOCYTE EST, POC: NEGATIVE
NITRITE UR-MCNC: NEGATIVE MG/ML
PH UR: 6 [PH] (ref 5–8)
PROT UR STRIP-MCNC: NEGATIVE MG/DL
RBC # UR STRIP: NEGATIVE /UL
SP GR UR: 1.03 (ref 1–1.03)
UROBILINOGEN UR QL: NORMAL

## 2025-04-21 RX ORDER — LEVOTHYROXINE SODIUM 100 UG/1
1 TABLET ORAL DAILY
COMMUNITY
Start: 2025-02-08

## 2025-04-21 RX ORDER — SPIRONOLACTONE 100 MG/1
1 TABLET, FILM COATED ORAL DAILY
COMMUNITY
Start: 2025-02-08 | End: 2025-04-21 | Stop reason: SDUPTHER

## 2025-04-21 RX ORDER — SPIRONOLACTONE 100 MG/1
100 TABLET, FILM COATED ORAL DAILY
Qty: 90 TABLET | Refills: 3 | Status: SHIPPED | OUTPATIENT
Start: 2025-04-21

## 2025-04-21 RX ORDER — DEXTROAMPHETAMINE SACCHARATE, AMPHETAMINE ASPARTATE MONOHYDRATE, DEXTROAMPHETAMINE SULFATE AND AMPHETAMINE SULFATE 2.5; 2.5; 2.5; 2.5 MG/1; MG/1; MG/1; MG/1
1 CAPSULE, EXTENDED RELEASE ORAL EVERY 12 HOURS SCHEDULED
COMMUNITY
Start: 2025-03-16

## 2025-04-21 NOTE — PROGRESS NOTES
GYN Annual Exam     Chief Complaint   Patient presents with    Gynecologic Exam       Nehal Roberts is a 49 y.o. female who presents for annual well woman exam. She is new to me - no. She is sexually active. Currently using s/p BTL for contraception. She is happy with her contraception: Yes.  S/p endometrial ablation in 2017 for menorrhagia    Periods are irregular, lasting a few days. Dysmenorrhea: none. Cyclic symptoms include breast tenderness. No intermenstrual bleeding, spotting, or discharge.  Performing SBE: yes    Recent spontaneous pneumothorax- resolved on it's own.  On spironolactone 100mg daily for cystic acne; needs RF.  H/o Hypothyroidism on Synthroid- managed by PCP.  On Lamictal for bipolar since she was a teenager- aware that some meds interact with medication.       Additional concerns- increasing HF, night sweats, dry skin, dry hair- concerned she is near menopause    HPI    OB History          4    Para   3    Term   2       1    AB   1    Living             SAB   1    IAB        Ectopic        Molar        Multiple        Live Births                    Last Pap: 2024- NIL/HPV neg  Last Mammogram: 2024- neg  Last Colonoscopy: 2024- neg  History of abnormal Pap smear: yes - 30+ years ago  Family history of uterine, colon or ovarian cancer: yes - mat Gma with colon ca; mat aunt with ovarian ca  Family history of breast cancer: yes - mat aunt in 70's  History of abnormal mammogram: no  Gardasil Vaccine: missed    Past Medical History:   Diagnosis Date    Abnormal ECG 2017    long pause    Abnormal Pap smear of cervix     Acid reflux     ADHD (attention deficit hyperactivity disorder)     Allergic 10/1977    Mushrooms, travis, strawberries, most plants, most animals    Allergic rhinitis     Anemia     Anxiety     Asthma     Bipolar disorder 2000    Bipolar 2    Cervical dysplasia     stage 0-1    Cholelithiasis     Chronic constipation     Colon polyp      Constipation     CTS (carpal tunnel syndrome) 2018    Cubital tunnel syndrome 05/24/2018    Depression     Headache     Heart murmur 11/27/2017    noticed by Dr. Clark    History of gastroesophageal reflux (GERD)     History of high cholesterol 2016    205    History of medical problems 2017    Edema, chronic fatigue, weight gain    HL (hearing loss) 1991    Hyperlipidemia 2016    Hypothyroid 2017    Patient feels its subc, but symptoms improved with thyroxine    Neuropathy associated with endocrine disorder 12/03/2020    Pancreatitis 03/2009    PID (pelvic inflammatory disease) 4/1995    PMS (premenstrual syndrome)     Worse with age    Pneumonia 11/2011    Multiple times, latest date remembered entered    Thyroid disorder     Urinary tract infection 2018    Varicella 6/1984    Visual impairment        Past Surgical History:   Procedure Laterality Date    ABLATION OF DYSRHYTHMIC FOCUS  06/14/2017    Uterine ablation    ADENOIDECTOMY  1985    BREAST BIOPSY      BREAST CYST ASPIRATION      BREAST CYST EXCISION Right     COLONOSCOPY N/A 01/17/2018    Procedure: COLONOSCOPY to cecum and t.i. with cold snare polypectomy;  Surgeon: Sarah Velasco MD;  Location: [facility];  Service:     COLONOSCOPY  01/2024    normal    COSMETIC SURGERY  1992    axillary breast tissue left and right removal    D & C WITH SUCTION      ENDOMETRIAL ABLATION      ESSURE TUBAL LIGATION      GALLBLADDER SURGERY  1996    HYSTEROSCOPY      HYSTEROSCOPY ENDOMETRIAL ABLATION N/A 06/14/2017    Procedure: HYSTEROSCOPY WITH ENDOMETRIAL ABLATION;  Surgeon: Sarah Beth Ray DO;  Location: [facility] OR;  Service:     LAPAROSCOPIC CHOLECYSTECTOMY  1996    OTHER SURGICAL HISTORY      DENTAL SURGERY    SINUS SURGERY  04/2009    TONSILLECTOMY      TONSILLECTOMY      TUBAL ABDOMINAL LIGATION  2014    Essure         Current Outpatient Medications:     amphetamine-dextroamphetamine XR (ADDERALL XR) 10 MG 24 hr capsule, Take 1 capsule by mouth Every 12  (Twelve) Hours, Disp: , Rfl:     levothyroxine (SYNTHROID, LEVOTHROID) 100 MCG tablet, Take 1 tablet by mouth Daily., Disp: , Rfl:     spironolactone (ALDACTONE) 100 MG tablet, Take 1 tablet by mouth Daily., Disp: 90 tablet, Rfl: 3    ALPRAZolam (XANAX) 0.5 MG tablet, Take 0.5 mg by mouth At Night As Needed., Disp: , Rfl: 2    atorvastatin (LIPITOR) 40 MG tablet, Take 1 tablet by mouth Every Night., Disp: 90 tablet, Rfl: 3    lamoTRIgine (LaMICtal) 150 MG tablet, , Disp: , Rfl:     montelukast (SINGULAIR) 10 MG tablet, Take 1 tablet by mouth Every Night., Disp: 90 tablet, Rfl: 3    vitamin D (ERGOCALCIFEROL) 1.25 MG (31633 UT) capsule capsule, , Disp: , Rfl:     Allergies   Allergen Reactions    Cannabinoids Diarrhea, Nausea And Vomiting, Rash, Shortness Of Breath and Swelling    Cat Dander Anaphylaxis    Hops Oil Dermatitis, Rash and Anaphylaxis    Avocado (Diagnostic) Unknown (See Comments)    Citrullus Vulgaris Other (See Comments)    Doxycycline Other (See Comments)     Second degree burns on the inside    Molds & Smuts Hives    Pollen Extract Hives    Short Ragweed Pollen Ext Hives    Strawberry (Diagnostic) Itching, Nausea And Vomiting, Swelling and Tinnitus    Amoxicillin Itching and Rash    Dust Mite Extract Rash    Grass Cough, Dermatitis, Hives, Itching, Rash, Swelling and Tinnitus    Tree Extract Cough, Hives, Itching, Rash and Tinnitus       Social History     Tobacco Use    Smoking status: Some Days     Current packs/day: 0.50     Average packs/day: 0.5 packs/day for 25.0 years (12.5 ttl pk-yrs)     Types: Cigarettes    Smokeless tobacco: Never    Tobacco comments:     Trying to quit- cut back to < 5 cigs/day   Vaping Use    Vaping status: Never Used   Substance Use Topics    Alcohol use: Yes     Comment: occasionally    Drug use: Never       Family History   Problem Relation Age of Onset    Arthritis Father     Depression Father     Hyperlipidemia Father     Mental illness Father         Anxiety     "Anxiety disorder Father     Thyroid disease Father     Arthritis Mother     Depression Mother     Hyperlipidemia Mother     Hypertension Mother     Sleep apnea Mother     Anesthesia problems Mother     Osteoporosis Mother     Diabetes Mother     Thyroid disease Mother     Irritable bowel syndrome Mother     Depression Brother     Learning disabilities Brother         Dyslexia, ADD    ADD / ADHD Son     Dysmenorrhea Son     Asthma Son     Learning disabilities Son         Dyslexia, Transferal Disorder, ADHD    Bipolar disorder Daughter     Asthma Daughter     Learning disabilities Daughter         ADHD    Mental illness Daughter         Bipolar II, Anxiety    Diabetes Maternal Grandmother     Colon cancer Maternal Grandmother     Bone cancer Maternal Grandmother     Arthritis Maternal Grandmother     COPD Maternal Grandmother     Breast cancer Maternal Aunt 71    Ovarian cancer Maternal Aunt 40 - 49    Mental illness Maternal Aunt     Multiple sclerosis Maternal Aunt     Bipolar disorder Maternal Aunt     Scleroderma Maternal Aunt     Cancer Paternal Aunt     Cancer Paternal Aunt         Lung    Cancer Paternal Uncle     Cancer Paternal Uncle     Cancer Paternal Uncle         Lung    Mental illness Other     Uterine cancer Neg Hx        Review of Systems   HENT:          Dry hair   Gastrointestinal:  Positive for nausea.   Endocrine: Positive for heat intolerance.   Genitourinary:  Positive for menstrual problem and urinary incontinence. Negative for breast discharge, breast lump and breast pain.   Skin:  Positive for dry skin.   Neurological:  Positive for dizziness.   Psychiatric/Behavioral:  Positive for sleep disturbance.        /76   Ht 175.3 cm (69\")   Wt 84.4 kg (186 lb)   BMI 27.47 kg/m²     Physical Exam  Vitals reviewed.   Constitutional:       General: She is awake. She is not in acute distress.     Appearance: She is not ill-appearing.   HENT:      Head: Normocephalic and atraumatic.   Eyes:      " Conjunctiva/sclera: Conjunctivae normal.   Pulmonary:      Effort: Pulmonary effort is normal. No respiratory distress.   Chest:   Breasts:     Right: Normal.      Left: Normal.   Abdominal:      Palpations: Abdomen is soft. There is no mass.      Tenderness: There is no abdominal tenderness.   Genitourinary:     General: Normal vulva.      Exam position: Lithotomy position.      Pubic Area: No rash.       Labia:         Right: No rash or lesion.         Left: No rash or lesion.       Urethra: No urethral lesion.      Vagina: Normal.      Cervix: Normal.      Uterus: Normal.       Adnexa: Right adnexa normal and left adnexa normal.   Musculoskeletal:      Cervical back: Neck supple. No rigidity.   Lymphadenopathy:      Upper Body:      Right upper body: No axillary adenopathy.      Left upper body: No axillary adenopathy.      Lower Body: No right inguinal adenopathy. No left inguinal adenopathy.   Skin:     General: Skin is warm and dry.      Capillary Refill: Capillary refill takes less than 2 seconds.   Neurological:      Mental Status: She is alert and oriented to person, place, and time.   Psychiatric:         Mood and Affect: Mood and affect normal.         Behavior: Behavior normal.            Assessment     Well woman exam   Contraception management  Vasomotor symptoms  Irregular cycles  Family h/o cancer- multiple types    Plan   Well woman exam: Pap collected- Yes. Instructed on how to perform SBE. Recommend MVI daily.    Breast Health: Clinical breast exam & mammogram yearly, Self breast awareness. Schedule screening mammogram.  Colon health:  colonoscopy due at 47 y.o.  Contraception: s/p BTL  STD: Enc condoms. Desires STD screen today- No; declined  Gardasil: missed  Smoking status:  yes I advised Nehal of the risks of continuing to use tobacco, and I provided her with tobacco cessation educational materials in the After Visit Summary. During this visit, I spent 2 minutes counseling the patient  "regarding tobacco cessation.  She is cutting back; using nicotine gum.  Body mass index is 27.47 kg/m². Diet and exercised discussed.  9.   Vasomotor symptoms- check FSH/Estradiol and thyroid panel/TPO. Patient counseled that hormone treatment should be used to help with specific symptoms related to menopause such as hot flashes, night sweats and vaginal atrophy.  Hormones should not be given for “general wellbeing” or to avoid aging. The lowest dose hormone that treats symptoms should be used for the shortest amount of time possible.  Although estrogen is the most effective treatment for hot flashes, other non hormonal options exist (such as Brisdelle or venlafaxine) and should also be considered.  Anyone with an intact uterus should also receive progestogen to prevent uterine overgrowth that can lead to uterine cancer.  All hormone therapy, whether it is synthetic or bio identical, can lead to increased risk of stroke, heart attack and thromboembolic diseases.  These adverse events are more likely to develop in the first year of use and in patients who are older than the typical menopausal age.  Risks of estrogen dependent cancers such as breast cancer increase with prolonged use.  Attempts to wean hormone therapy should be discussed annually and particularly after three to five years of use.  Any side effects such as vaginal bleeding or pain should be reported immediately.  Informed that she is not a candidate for estrogen use since she is a smoker.  She is on Lamictal- some SSRI's do not mix well with Lamictal.   Check CMP in case she decides to try Veozah- will need to monitor LFT's  10.  Irregular cycle- check FSH/estradiol.  Menopause is one whole year without a menstrual cycle in the correct age individual.  The \"perimenopause\" refers to hormonal changes and symptoms that may occur in the 5 to 10 years prior to cycles actually stopping.  The symptoms may include hot flashes, night sweats, insomnia or altered " quality of sleep, moodiness, irritability, weight gain, hair loss or growth, libido changes as well as changes in the length and severity of menstrual bleeding.  Any vaginal bleeding that last longer than 10 days, any cycles that are closer together than 21 days or any cycles that are very heavy should prompt an appointment for evaluation.  11. Family h/o cancer- strong family hx of cancer; multiple types; desires Empower testing today  12. Urinary incontinence-  mild; not bothersome; rec Kegel exercises; can refer to pelvic floor therapy if worsens  13. Cystic acne- doing well on Spironolactone; ERX RF    Follow-up as needed and 1 year for AE.      I spent 20 minutes on the separately reported service of vasomotor symptoms/treatments. This time is not included in the time used to support the E/M service also reported today. This time does not include time spent performing ultrasound.    Raquel Marie, APRN  4/22/2025  16:27 EDT

## 2025-04-22 LAB
ESTRADIOL SERPL-MCNC: 183 PG/ML
FSH SERPL-ACNC: 3.2 MIU/ML
T3 SERPL-MCNC: 137 NG/DL (ref 80–200)
T4 FREE SERPL-MCNC: 1.58 NG/DL (ref 0.92–1.68)
THYROPEROXIDASE AB SERPL-ACNC: 14 IU/ML (ref 0–34)
TSH SERPL DL<=0.005 MIU/L-ACNC: 1.95 UIU/ML (ref 0.27–4.2)

## 2025-04-23 DIAGNOSIS — R61 NIGHT SWEATS: Primary | ICD-10-CM

## 2025-04-23 RX ORDER — PROGESTERONE 100 MG/1
100 CAPSULE ORAL NIGHTLY
Qty: 30 CAPSULE | Refills: 11 | Status: SHIPPED | OUTPATIENT
Start: 2025-04-23

## 2025-04-24 LAB
CYTOLOGIST CVX/VAG CYTO: NORMAL
CYTOLOGY CVX/VAG DOC CYTO: NORMAL
CYTOLOGY CVX/VAG DOC THIN PREP: NORMAL
DX ICD CODE: NORMAL
HPV I/H RISK 4 DNA CVX QL PROBE+SIG AMP: NEGATIVE
OTHER STN SPEC: NORMAL
SERVICE CMNT-IMP: NORMAL
STAT OF ADQ CVX/VAG CYTO-IMP: NORMAL

## 2025-05-04 LAB
Lab: NEGATIVE
Lab: NORMAL

## 2025-05-07 ENCOUNTER — HOSPITAL ENCOUNTER (OUTPATIENT)
Dept: MAMMOGRAPHY | Facility: HOSPITAL | Age: 50
Discharge: HOME | End: 2025-05-07
Admitting: NURSE PRACTITIONER
Payer: COMMERCIAL

## 2025-05-07 DIAGNOSIS — Z12.31 BREAST CANCER SCREENING BY MAMMOGRAM: ICD-10-CM

## 2025-05-07 PROCEDURE — 77067 SCR MAMMO BI INCL CAD: CPT

## 2025-05-07 PROCEDURE — 77063 BREAST TOMOSYNTHESIS BI: CPT

## 2025-08-05 ENCOUNTER — OFFICE VISIT (OUTPATIENT)
Dept: OBSTETRICS AND GYNECOLOGY | Facility: CLINIC | Age: 50
End: 2025-08-05
Payer: COMMERCIAL

## 2025-08-05 VITALS
SYSTOLIC BLOOD PRESSURE: 124 MMHG | WEIGHT: 187 LBS | BODY MASS INDEX: 27.7 KG/M2 | HEIGHT: 69 IN | DIASTOLIC BLOOD PRESSURE: 80 MMHG

## 2025-08-05 DIAGNOSIS — R10.2 PELVIC PAIN: ICD-10-CM

## 2025-08-05 DIAGNOSIS — N83.201 CYST OF RIGHT OVARY: Primary | ICD-10-CM

## 2025-08-05 PROCEDURE — 99213 OFFICE O/P EST LOW 20 MIN: CPT | Performed by: NURSE PRACTITIONER

## (undated) DEVICE — CYSTO/BLADDER IRRIGATION SET, REGULATING CLAMP

## (undated) DEVICE — Device

## (undated) DEVICE — CATHETER,URETHRAL,REDRUBBER,STRL,16FR: Brand: MEDLINE

## (undated) DEVICE — GLV SURG SENSICARE MICRO PF LF 6 STRL

## (undated) DEVICE — CANN NASL CO2 TRULINK W/O2 A/

## (undated) DEVICE — Device: Brand: DEFENDO AIR/WATER/SUCTION AND BIOPSY VALVE

## (undated) DEVICE — THE TORRENT IRRIGATION SCOPE CONNECTOR IS USED WITH THE TORRENT IRRIGATION TUBING TO PROVIDE IRRIGATION FLUIDS SUCH AS STERILE WATER DURING GASTROINTESTINAL ENDOSCOPIC PROCEDURES WHEN USED IN CONJUNCTION WITH AN IRRIGATION PUMP (OR ELECTROSURGICAL UNIT).: Brand: TORRENT

## (undated) DEVICE — LAG PERI GYN: Brand: MEDLINE INDUSTRIES, INC.

## (undated) DEVICE — PROB ABL ENDOMTRL NOVASURE/G1 W/SURESND BIP

## (undated) DEVICE — THE ISNARE OVAL SYSTEM IS A DUAL LUMEN DEVICE PROVIDING BOTH NEEDLE INJECTION AND MONOPOLAR ELECTROCAUTERY POLYPECTOMY SNARE CAPABILITY WITHIN A SINGLE CATHETER DEVICE.: Brand: ISNARE

## (undated) DEVICE — TUBING, SUCTION, 1/4" X 10', STRAIGHT: Brand: MEDLINE

## (undated) DEVICE — PAD PERI POST

## (undated) DEVICE — THE SINGLE USE ETRAP – POLYP TRAP IS USED FOR SUCTION RETRIEVAL OF ENDOSCOPICALLY REMOVED POLYPS.: Brand: ETRAP